# Patient Record
Sex: FEMALE | URBAN - METROPOLITAN AREA
[De-identification: names, ages, dates, MRNs, and addresses within clinical notes are randomized per-mention and may not be internally consistent; named-entity substitution may affect disease eponyms.]

---

## 2017-02-03 ENCOUNTER — CARE COORDINATION (OUTPATIENT)
Dept: ONCOLOGY | Facility: CLINIC | Age: 42
End: 2017-02-03

## 2017-02-03 NOTE — PROGRESS NOTES
Information regarding first visit with A[facility]     --reviewed that Dr. Rodarte schedules 90 minutes for new pt appointments to give plenty of time to patients to answer questions. Encourage patient to bring along list of questions to discuss with Dr. Britton Rodarte.   --explained Dr. Rodarte will determine what testing to order after seeing pt and reviewing past medical history. Testing usually includes blood draw, and urine specime including 24 hour urine test that typically is started the next morning.   --explained that if safe to do so, pt should not take either NSAID's (including salicylates that can be in self care products and foods) or PPI's for 5 to 7 days prior to appt re: some of the testing that Dr. Rodarte may want to perform after seeing pt. Examples of NSAID's are Advil/ibuprofen or Aleve/naproxen and ASA. Examples of PPI's are Prilosec/omeprazole or Protonix/pantoprazole. Stated if questions regarding if any of her medications are either NSAID's or PPI's, pt can discuss with her local pharmacist or call RN. Also stressed only to stop these medications if safe to do so. Also stated these are the only medications Dr. Rodarte would want pt to stop (again if safe to do so).  Continue anti-histamines and other medications that are not NSAID's or PPI's.     expressed concerns that they were hoping to minimize stay in [place] re: young daughter.  Asking if possible to do some testing on Monday evening (arrive in [facility] ~5:00 pm),  24 hour urine testing supplies.  Then would do 24 hour collection Tuesday morning to Wednesday morning so could leave Wednesday.  RN expressed concern about challenge of arriving here in timeframe to be able to collect (and process) random spec.  Stated author would try to check into how late lab is open.    --discussed not uncommon to have to repeat testing and that he will not give dx without supporting lab evidence.     --discussed need to have a local physician working with her  as Dr. Rodarte will not prescribe medications from a distance.      --discussed Dr. Rodarte's willingness to work with local physicians once they initiate contact.  --instruct to bring a list of providers who are involved in pt care and therefore should receive copy of his progress note/recommendations. Also suggested to tell Dr. Rodarte in appt if pt wants to receive a copy of his progress note/recommendations.  --phone number for Gabi Mullins re: costs.  Given clinic nurse line number to call re: additional questions.    Encouraged pt to call if  any questions or concerns.

## 2017-02-09 ENCOUNTER — CARE COORDINATION (OUTPATIENT)
Dept: ONCOLOGY | Facility: CLINIC | Age: 42
End: 2017-02-09

## 2017-02-09 DIAGNOSIS — R53.82 CHRONIC FATIGUE: Primary | ICD-10-CM

## 2017-02-09 NOTE — PROGRESS NOTES
Tried to reach out to  to inform him lab is open for walk in patients until 7:00.  Voice mail box full.  Spoke with Edwin after he left message wondering if missed call was from author and realizing his voice mail box was full (which he reported he emptied).  He also stated in message that he went ahead and booked flight for arrival on Monday and leave on Wednesday late afternoon/evening.  Informed Edwin that lab is open for walk in patients until 7:00 but included not sure that they (lab staff) would allow [place] to leave a random specimen depending on time.  Stated they should be able to  24 hour urine test supplies.  Also discussed that Dr. Rodarte has expressed preference for 24 hour urine testing if he had to choose between either random or 24 hr specimen.  Stated would communicate with Dr. Rodarte regarding lab orders for urine testing.

## 2017-02-10 ENCOUNTER — CARE COORDINATION (OUTPATIENT)
Dept: ONCOLOGY | Facility: CLINIC | Age: 42
End: 2017-02-10

## 2017-02-10 NOTE — PROGRESS NOTES
Left message for Edwin stating that Dr. Rodarte entered all the usual lab orders (blood, random urine and 24 hour urine) but given time they are likely to arrive on the Monday evening (2/27/17) advised that they most they will likely be able to do is the random urine specimen and get supplies for the 24 hour urine testing.  Also stated not sure if sent instructions for 24 hour urine test.  Stated could send to wife's e-mail or another e-mail that they designate.  Requested call back via nurse clinic line with questions or info on where to send instructions.

## 2017-02-27 DIAGNOSIS — R53.82 CHRONIC FATIGUE: ICD-10-CM

## 2017-02-27 LAB
ALBUMIN SERPL-MCNC: 3.9 G/DL (ref 3.4–5)
ALP SERPL-CCNC: 62 U/L (ref 40–150)
ALT SERPL W P-5'-P-CCNC: 46 U/L (ref 0–50)
ANION GAP SERPL CALCULATED.3IONS-SCNC: 7 MMOL/L (ref 3–14)
APTT PPP: 32 SEC (ref 22–37)
AST SERPL W P-5'-P-CCNC: 37 U/L (ref 0–45)
BASOPHILS # BLD AUTO: 0 10E9/L (ref 0–0.2)
BASOPHILS NFR BLD AUTO: 0.4 %
BILIRUB SERPL-MCNC: 0.3 MG/DL (ref 0.2–1.3)
BUN SERPL-MCNC: 7 MG/DL (ref 7–30)
CALCIUM SERPL-MCNC: 8.6 MG/DL (ref 8.5–10.1)
CHLORIDE SERPL-SCNC: 105 MMOL/L (ref 94–109)
CO2 SERPL-SCNC: 30 MMOL/L (ref 20–32)
CREAT SERPL-MCNC: 0.71 MG/DL (ref 0.52–1.04)
DIFFERENTIAL METHOD BLD: ABNORMAL
EOSINOPHIL # BLD AUTO: 0.1 10E9/L (ref 0–0.7)
EOSINOPHIL NFR BLD AUTO: 2.4 %
ERYTHROCYTE [DISTWIDTH] IN BLOOD BY AUTOMATED COUNT: 11.5 % (ref 10–15)
GFR SERPL CREATININE-BSD FRML MDRD: 90 ML/MIN/1.7M2
GLUCOSE SERPL-MCNC: 60 MG/DL (ref 70–99)
HCT VFR BLD AUTO: 31.2 % (ref 35–47)
HGB BLD-MCNC: 10.3 G/DL (ref 11.7–15.7)
IMM GRANULOCYTES # BLD: 0 10E9/L (ref 0–0.4)
IMM GRANULOCYTES NFR BLD: 0.2 %
INR PPP: 0.95 (ref 0.86–1.14)
LYMPHOCYTES # BLD AUTO: 1.4 10E9/L (ref 0.8–5.3)
LYMPHOCYTES NFR BLD AUTO: 31.9 %
MAGNESIUM SERPL-MCNC: 2.5 MG/DL (ref 1.6–2.3)
MCH RBC QN AUTO: 29.9 PG (ref 26.5–33)
MCHC RBC AUTO-ENTMCNC: 33 G/DL (ref 31.5–36.5)
MCV RBC AUTO: 91 FL (ref 78–100)
MISCELLANEOUS TEST: NORMAL
MONOCYTES # BLD AUTO: 0.4 10E9/L (ref 0–1.3)
MONOCYTES NFR BLD AUTO: 9.3 %
NEUTROPHILS # BLD AUTO: 2.5 10E9/L (ref 1.6–8.3)
NEUTROPHILS NFR BLD AUTO: 55.8 %
NRBC # BLD AUTO: 0 10*3/UL
NRBC BLD AUTO-RTO: 0 /100
PLATELET # BLD AUTO: 195 10E9/L (ref 150–450)
POTASSIUM SERPL-SCNC: 4 MMOL/L (ref 3.4–5.3)
PROT SERPL-MCNC: 7.3 G/DL (ref 6.8–8.8)
RBC # BLD AUTO: 3.44 10E12/L (ref 3.8–5.2)
SODIUM SERPL-SCNC: 142 MMOL/L (ref 133–144)
WBC # BLD AUTO: 4.5 10E9/L (ref 4–11)

## 2017-02-28 ENCOUNTER — ONCOLOGY VISIT (OUTPATIENT)
Dept: ONCOLOGY | Facility: CLINIC | Age: 42
End: 2017-02-28
Attending: INTERNAL MEDICINE
Payer: COMMERCIAL

## 2017-02-28 ENCOUNTER — CARE COORDINATION (OUTPATIENT)
Dept: ONCOLOGY | Facility: CLINIC | Age: 42
End: 2017-02-28

## 2017-02-28 VITALS
HEIGHT: 70 IN | TEMPERATURE: 96.8 F | RESPIRATION RATE: 16 BRPM | SYSTOLIC BLOOD PRESSURE: 102 MMHG | WEIGHT: 121.5 LBS | HEART RATE: 75 BPM | OXYGEN SATURATION: 100 % | DIASTOLIC BLOOD PRESSURE: 60 MMHG | BODY MASS INDEX: 17.39 KG/M2

## 2017-02-28 DIAGNOSIS — R53.82 CHRONIC FATIGUE: Primary | ICD-10-CM

## 2017-02-28 PROCEDURE — 99205 OFFICE O/P NEW HI 60 MIN: CPT | Mod: ZP | Performed by: INTERNAL MEDICINE

## 2017-02-28 PROCEDURE — 99355 ZZC PROLONGED SERV,OFFICE,EA ADDN 1/2: CPT | Mod: ZP | Performed by: INTERNAL MEDICINE

## 2017-02-28 PROCEDURE — 99214 OFFICE O/P EST MOD 30 MIN: CPT

## 2017-02-28 PROCEDURE — 99213 OFFICE O/P EST LOW 20 MIN: CPT | Mod: ZF

## 2017-02-28 PROCEDURE — 99354 ZZC PROLONGED SERV,OFFICE,1ST HR: CPT | Mod: ZP | Performed by: INTERNAL MEDICINE

## 2017-02-28 RX ORDER — METRONIDAZOLE 7.5 MG/G
GEL TOPICAL 2 TIMES DAILY
COMMUNITY
Start: 2015-10-29

## 2017-02-28 RX ORDER — KETOTIFEN FUMARATE 100 %
2 POWDER (GRAM) MISCELLANEOUS 3 TIMES DAILY
COMMUNITY

## 2017-02-28 RX ORDER — AMITRIPTYLINE HYDROCHLORIDE 10 MG/1
20 TABLET ORAL DAILY
COMMUNITY

## 2017-02-28 RX ORDER — THYROID, PORCINE 60 MG/1
81 TABLET ORAL DAILY
COMMUNITY
Start: 2015-07-17

## 2017-02-28 RX ORDER — GABAPENTIN 300 MG/1
100 CAPSULE ORAL 3 TIMES DAILY
COMMUNITY

## 2017-02-28 RX ORDER — LORAZEPAM 0.5 MG/1
0.5 TABLET ORAL DAILY PRN
COMMUNITY
Start: 2015-05-13

## 2017-02-28 RX ORDER — DOXEPIN HYDROCHLORIDE 10 MG/ML
12 SOLUTION ORAL DAILY
COMMUNITY

## 2017-02-28 RX ORDER — ALBENDAZOLE 200 MG/1
200 TABLET, FILM COATED ORAL 2 TIMES DAILY WITH MEALS
COMMUNITY

## 2017-02-28 RX ORDER — CETIRIZINE HYDROCHLORIDE 10 MG/1
10 TABLET ORAL 2 TIMES DAILY
COMMUNITY
Start: 2015-06-15

## 2017-02-28 ASSESSMENT — PAIN SCALES - GENERAL: PAINLEVEL: NO PAIN (0)

## 2017-02-28 NOTE — NURSING NOTE
"[place]ia Sheffield is a 42 year old female who presents for:  Chief Complaint   Patient presents with     Oncology Clinic Visit     Mast Cell Disorder        Initial Vitals:  /60 (BP Location: Left arm, Patient Position: Chair, Cuff Size: Adult Regular)  Pulse 75  Temp 96.8  F (36  C) (Oral)  Resp 16  Ht 1.789 m (5' 10.43\")  Wt 55.1 kg (121 lb 8 oz)  LMP 02/17/2017 (Exact Date)  SpO2 100%  BMI 17.22 kg/m2 Estimated body mass index is 17.22 kg/(m^2) as calculated from the following:    Height as of this encounter: 1.789 m (5' 10.43\").    Weight as of this encounter: 55.1 kg (121 lb 8 oz).. Body surface area is 1.65 meters squared. BP completed using cuff size: small regular  No Pain (0) Patient's last menstrual period was 02/17/2017 (exact date). Allergies and medications reviewed.     Medications: Medication refills not needed today.  Pharmacy name entered into EPIC: Data Unavailable    Comments:     15 minutes for nursing intake (face to face time)   Argelia Garcia LPN        "

## 2017-02-28 NOTE — LETTER
"  2017      RE: Virginia Sheffield  [address] [facility] [address]       Patient: Virginia Sheffield   (MRN [id number])   Encounter Date: 2017  Referring: Terrell Boss M.D. ([facility] [place], [place], [phone], fax [phone]), Fe Cole M.D. ([facility], [address], [place] [address], [phone], fax [phone])  Attending: Britton Rodarte M.D.     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  At the beginning of the encounter, I reviewed all available chart data, consisting principally of about 40 pages of outside hardcopy medical records, the salient points of which I've incorporated into the narrative below.  I also reviewed a few e-mails the patient, Dr. Boss, and I had exchanged in .  The history here is a bit complex, and it's probably best to just present it all chronologically, blending HPI and PMH as follows.  Note she presented her history in a bit of a scattershot fashion, and I've done my best to rearrange it chronologically.  Her history of chronic multisystem polymorbidity of generally inflammatory and allergic themes is literally life-long, as she knows she was a \"colicky\" baby from birth and quickly proved \"allergic to breast milk\" and had to be fed with soy-based formula.  She recalls gait problems while learning to walk, and she required braces in toddlerhood.  She knows she has \"always\" (going back at least to her earliest memories) been anemic, though no cause for this has ever been found.  She recalls tonsillectomy and adenoidectomy was performed at 7 due to \"frequent post-nasal drip\" (though I suspect upper respiratory tract infection problems were more significant than just frequent post-nasal drip to get " "to the point of pursuing surgery).  Menarche came at 12 and was notable only for possible menorrhagia for which she then started oral contraceptives a year or two later.  Chronic sinusitis emerged at 15, though she attributes this to insufficient sleep, noting that she has \"always\" (back to her earliest childhood memories) felt sick for not having gotten enough sleep, and she says that once she started getting more sleep, the sinusitis improved.  She recalls developing \"sensitivities\" to various tastes and odors in her late teens, and these problems have slowly but steadily worsened ever since, though they seemed to markedly worsened with her second pregnancy.  Her first anxiety attack came upon graduating from college and continued occasionally after that point, markedly worsening with the second pregnancy (see below).  At 26 she developed sacroiliitis due to a lot of driving in her job as a , and gastritis emerged around this time, too.  Her first pregnancy came at 30 but miscarried around 4-6 weeks, attributed to hypothyroidism discovered at that point.  Her second pregnancy came at 31 and coursed well except for premature labor at 32 weeks, a bout of bronchitis needing antibiotics, and then delivery at 35 weeks.  She says she suffered post-partum depression due to insufficient sleep.  Anxiety, too, re-emerged in this post-partum period (she knows lorazepam helps, but she only uses it occasionally), and insomnia developed in spite of her exhaustion.  At 33 she recognized that capsicum peppers were a principal trigger of her gastritis, so she deleted them from her diet and saw the gastritis remit.  As her daughter was growing up, both of them \"always got whatever was going around.\"  She says she was sick every 1-2 months because of this.  At 36 she was diagnosed with rosacea.  At 37 she began developing excessive gas and episodes of acute diarrhea.  She says testing of a stool sample showed she had \"a " "whipworm parasite,\" which was treated and symptoms resolved.  Soon after this, though, she became hyperthyroid, and it took nine months of frequent adjustments to her thyroid medication to finally stabilize her thyroid function, during which time she was hypersensitive to assorted sensory stimuli.  She also says she has been hypersensitive to assorted sensory stimuli and stressors since her second pregnancy.  Also around age 37 she was started on a number of nutritional supplements but quickly developed palpitations, anxiety, and irritability, especially premenstrually.  At 37 she developed left ankle pain of unclear etiology.  At 38 she developed a frozen right shoulder of unclear cause (possibly due to a \"tiny\" labral tear, she says).  At 38 she learned that ingestion of a high-fat-content meal caused nausea.  Around age 38 she had an infectious episode of some sort and needed antibiotics but found the drug caused the same whole-body burning sensation (including a \"buzzing\" sensation along the length of the spinal column, an \"anxiety in my spine\") that re-emerged and became constant after her third pregnancy.  The same sensation also has happened on eating barbeque at age 39.  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  She says she has been unable to eat protein since age 40.  She also notes that at age 40 she felt the best she had felt in a long time.  She decided to pursue a third pregnancy, but this, too, miscarried at about 4-6 weeks, attributed at the time to her age.  Within days after the miscarriage, she was started on a number of new supplements and went on a [place] vacation.  On the return flight, her legs started itching, and then two days later, while wearing a new (and unwashed) shirt from [place], a \"big red pustular burning rash\" about her bilateral lower anterior ribs erupted.  This resolved quickly with " "hydrocortisone cream, but five days later the same burning sensation returned throughout her body, albeit without any visible rash.  From that point on, eating would reliably intensify symptoms.  Dietary changes were unhelpful.  She began consulting many physicians including at [facility] and eventually the [facility] ([place]).  She says no clear diagnosis was ever made, though at one point she was diagnosed with an eating disorder, and then a subsequent consultation with a [place] psychiatrist refuted that diagnosis.  She says she has developed many more dietary sensitivities since.  She notes her acupuncturist and her  were the first to suspect MCAD might be at the root of her problems, leading to the present evaluation.  She says a low histamine diet has been only slightly helpful.  She says she is down to just 14 foods.  She says she often feels as if her brain is \"on fire.\"  She notes she has lost 20 pounds (to 115 pounds) in the last two years.    On a full review of systems, although she denies any issues with flushing, sweats, tinnitus, epistaxis, easy bleeding, sinonasal congestion, coryza, post-nasal drip, throat irritation, dysphagia, chest pain, gastroesophageal reflux, vomiting, diarrhea, abdominal pain, bloating, edema, adenopathy, syncope, hair problems, or hives, she endorses a wide range of other, chronic/recurrent, episodic/intermittent and/or waxing/waning issues including subjective fevers and presyncope on prolonged standing in childhood, feeling cold much of the time, fatigue (often to the point of exhaustion), malaise, headaches, diffusely migratory aching/pain, diffusely migratory pruritus, unprovoked fluctuations in weight and appetite, irritation of the eyes, acute brief inability to focus vision, easy bruising, a burning sensation in her ears, occasional intranasal sores, burning tongue, proximal dysphagia, a burning sensation in her lungs, post-prandial palpitations, nausea, " "constipation, urinary hesitancy when flying, diffusely migratory weakness, diffusely migratory edema, diffusely migratory tingling/numbness, orthostatic and non-orthostatic presyncope, cognitive dysfunction (particularly memory, concentration, and word-finding), deterioration of dentition despite good attention to dental hygiene, brittle nails, diffusely migratory rashes (typically patchy macular erythema), insomnia, anxiety, depression, modest joint hypermobility, and poor healing in general.  Complete ROS o/w neg.    Family history is notable for a father who is a recovering alcoholic and who has \"always\" had \"digestive issues\" and who had a bout with idiopathic urticaria which resolved, a brother with allergies, a mother with allergies and type 2 diabetes mellitus and obesity and heart disease requiring a pacemaker, \"digestive issues,\" arthritis, \"significant body pain,\" hypersensitivity to odors, intolerance of alcohol, and death recently from complications from coronary artery bypass surgery, and a daughter with food sensitivities and lichen sclerosis. The patient denies any history of smoking, significant alcohol use (in fact, she flushes to red wine and gets an \"easy hangover\" upon drinking even a tiny quantity), or illegal substance use except for marijuana use in high school and college which did help her feel better.    She lists her current medications as azelaic acid topically bid, cetirizine 10 mg bid (\"definitely\" helping), generic oral cromolyn 200 mg tid, compounded oral ketotifen 2 mg tid (\"definitely\" helps, though the effect only lasts 1-2 hours), lorazepam 0.5 mg prn, amitriptyline 20 mg qd, Nature Thyroid 81 mg qd, doxepin solution 12 mg qd, gabapentin 100 mg tid, metronidazole 0.75% topical gel bid, and a course presently of albendazole 200 mg bid to treat leishmaniasis reportedly diagnosed recently in a stool sample.  She lists her current allergies as rash to adhesive tape, hypertension to " "epinephrine, and urticarial rash to penicillins.    Exam finds a tired, abnormally thin woman for her height, concerned and seldom expressing a smile but in no apparent acute distress, pleasant enough given her concern, cooperative, fully alert and oriented, independently ambulatory, presenting with her obviously appropriately concerned/supportive  who was taking notes throughout the encounter and who had an excellent understanding of her history (his memory clearly eclipsed hers at several points in the encounter).  Vitals per chart, notable for /60, pulse 75, weight 122 pounds.  Key findings are her tired, thin, unhappy, vaguely chronically ill general appearance, HEENT benign, no plethora/pallor/diaphoresis/jaundice/rash/bleeding/bruising, neck supple, no JVD or thyromegaly or carotid bruits, no palpable adenopathy or tenderness at any of the usual node-bearing sites, clear lungs, regular heart with no adventitious sounds, abdomen benign, just the very slightest trace of distal bilateral lower extremity pitting edema, neuro grossly intact except for tingling \"all over.\"  On a light scratch test on the upper back, moderately bright dermatographism (erythroderma only, no hives) quickly emerged and was fully sustained (perhaps even brighter?) when last checked 10 minutes later.     Review of available lab data was notable for a wide range of normal labs at [facility] in 6/15 including tryptase, whole blood histamine, CMP, iron studies, folate, B12, and 24-hour urinary N-methylhistamine and 11-beta-prostaglandin-F2-alpha (also negative [id number] mutation analysis in peripheral blood), plus a similar range of normal labs at [facility] in 10/15 (also a negative 24-hour urinary 5-HIAA at this time).  In 7/15 chromogranin A was normal, but transforming growth factor beta 1 was roughly triple the upper limit of normal.  In 9/15 another set of routine chemistries and the above-noted mast cell  labs were again " "normal.  The patient and her  note there is uncertainty as to whether the specimens were handled properly by the various labs except in the most recent round of testing.  Duodenal biopsy obtained at [facility] in 10/15 was read as normal (only routine staining was performed).  Skin biopsy at [email or web address] (unclear date) showed \"urticarial hypersensitivity reaction with slight spongiosis, focally excoriated, with a perivascular and interstitial infiltrate including lymphocytes and eosinophils.\"    Labs done here yesterday included a 100% normal CMP except for mild hypoglycemia (60 mg/dl) and minimal hypermagnesemia, a CBCD notable only for mild normocytic anemia (Hgb 10.3 g/dl), and normal PT/PTT.    A/P: Kudos to the patient's acupuncturist and the patient's , because I think they're likely right in their suspicion that a mast cell activation disorder (MCAD) -- and far more likely the far more prevalent (if only recently recognized) type termed mast cell activation syndrome (MCAS) than the rare (but long recognized) type termed mastocytosis -- is at the root of most or all of the patient's chronic multisystem polymorbidity of generally inflammatory and allergic themes. Beyond all the other diseases already ruled out by the great extent of testing she's undergone to date, I don't know of any other human disease that comes anywhere near as close as MCAS does to accounting, directly or indirectly, for the full range and chronicity of all the symptoms and findings here. (Of note, too, I'm not saying that any of her prior diagnoses are wrong (other than any assertions of psychosomatism which might have been made along the way). It's just that each of them accounts for only one subset or another of all that's gone on in her, while MCAS can account for most or all of everything that's been going on in her.) All of that said, she needs objective laboratory evidence of improperly behaving mast cells (note it's possible the " results from some of her earlier mast cell  determinations might be false negatives due to specimen handling issues), toward which end I ordered the range of testing I typically check in assessing for MCAS, namely, a CBCD, CMP, magnesium, PT/PTT, chilled serum tryptase and chromogranin A, chilled plasma prostaglandin D2 and histamine and heparin, chilled random urinary prostaglandin D2 and N-methylhistamine and leukotriene E4 and 2,3-dinor 11-beta-prostaglandin-F2-alpha, and chilled 24-hour urinary prostaglandin D2 and N-methylhistamine and leukotriene E4 and 2,3-dinor 11-beta-prostaglandin-F2-alpha. I also ordered an anti-IgE IgG and an anti-IgE receptor antibody, which won't be useful diagnostically but may influence certain therapeutic decisions down the road if MCAS is proven. We confirmed she has abstained from confounding use of proton pump inhibitors (PPIs) and non-steroidal anti-inflammatory drugs (NSAIDs) for the prior 5+ days. We provided her careful written and verbal instructions regarding the 24-hour urine collection including the importance of continuous chilling of the specimen throughout collection and transport. She understands the various reasons why a single round of  testing might yield all negative results, and she understands that if this happens, it of course doesn't even begin to invalidate/refute/negate even a single element of her history (which strongly argues for a mast cell disorder). Her history is what it is, so if we get a negative round of testing, I'll simply recommend repeat testing (which she'll of course need to pursue locally), albeit with specimen collection at that point preferably deferred to a particularly symptomatic day. I also asked her to work with our clinic nurse coordinator, Clary Regan RN, to try to arrange for her old duodenal biopsy to be reassessed (either locally or here) with  staining, as mast cells can't be seen with routine H&E  "staining and I've often seen \"normal\" or \"mildly chronically inflamed\" GI tract biopsies in MCAS patients \"light up\" on  staining revealing mast cell disease. If 2-3 rounds of blood and urine testing yields all negative results, and if restaining of any available old biopsies either can't be done for some reason or yields negative results, the \"backup plan\" will be to pursue a fresh set of bidirectional endoscopies to obtain biopsies throughout the luminal GI tract for pathologic evaluation specifically (using  staining at a minimum) for increased (>20/hpf) numbers of mast cells (as often seen mildly-moderately in MCAS, though not to anywhere near the degree (or patterns) seen in mastocytosis).     Although I cautioned her that she doesn't have a definitive diagnosis of MCAS yet, we did engage in extended discussion today about general aspects of MCAS including its essential nature of chronic multisystem polymorbidity of a generally inflammatory theme, the availability of many therapies shown helpful in various MCAD/MCAS patients, the good likelihood of (eventually) finding therapy that will help her achieve the goal of feeling significantly better than the pre-treatment baseline the majority of the time, and the present frustrating absence of any biomarkers that can help predict which of the many available, potentially helpful therapies is most likely to benefit the individual patient. She understands that if we are able to secure a diagnosis of MCAS, she will be dependent on pursuing -- in patient, persistent, and methodical fashion, trying as hard as possible to make just one change at a time -- trials of the various therapies (which, again, lacking predictive biomarkers, we generally pursue in order of escalating cost). She understands that once all test results are available about a month from now, I will write an addendum to this note (to again be distributed to all of her physicians listed below) " "providing my interpretation of the results and recommendations for next steps.     I told her that the only therapy I am willing to empirically recommend in a patient with suspected, but not yet proven, MCAS is a full (H1 + H2) histamine receptor blockade as she is presently doing.  She understands that some patients find that alternative H1 or H2 blockers serve them better, some patients find that tid dosing serves them better than bid dosing, and some patients find that slightly higher dosages (e.g., 20-30 mg rather than 10 mg of cetirizine, or 150 mg rather than 75 mg of ranitidine) serve them better than lower dosages. She will need to \"experiment,\" taking 2-4 weeks with each specific combination of drug/dose/frequency to understand what it can accomplish for her in controlling this disease of naturally waxing/waning intensity. I also cautioned her that MCAD/MCAS patients have quite a predilection to adversely react not necessarily to the active ingredients in their medications but rather to the excipients (fillers, binders, dyes, preservatives, etc.) in their medications. As such, if she experiences an adverse reaction very shortly after beginning an ordinarily well tolerated medication (as is certainly the case with the H1 and H2 blockers), excipient reactivity must be suspected and she should promptly work with her pharmacist to review the medication's ingredient list, try to identify the likely offending ingredient, and try one or more alternative formulations of the medication which don't share any of the offending formulation's excipients. She appears to understand.     She lives far too distantly to return here with any significant frequency.  She understands she will remain 100% dependent on her local providers for management of all acute and chronic issues with the disease.  I'll see her roughly annually for strategic reassessments.     As is usually the case with initial consultations for mast cell " "disease, this was a long encounter, 135 minutes in total, with 70 minutes spent in counseling. The patient and her  indicated that all of their questions at this time had been answered to their satisfaction, and they expressed appreciation for the time I had given them.      Typed, reviewed, and electronically signed by: Britton Rodarte M.D.     DT: 02/28/17 08:33 PM    cc: 1. Terrell Boss M.D. ([address] [place] [address], [phone], fax [phone])  2. Fe Cole M.D. ([facility], [address], [place] [address], [phone], fax [phone])      Addendum (3/25/17): Kudos to the patient's acupunturist and the patient's , as they have been proven right: labs are back are back and are diagnostic. As is commonly seen in this work-up which necessarily casts a fairly wide net over the range of clinically testable mediators which are relatively specific to the mast cell, most of the tests were normal (or virtually so), but the tests detailed below were positive.  (All of the prostaglandin D2 tests were below their lower limits of normal, suggesting specimen mishandling or occult salicylate use, but it's a moot point now that diagnosis has been established.)  I'll also note that somehow we did not get the quantitative immunoglobulin profile I had ordered, so if she's not had that checked locally, it still ought to be pursued locally to rule out a significant (and potentially treatable) immunoglobulin deficiency contributing to the frequent \"infections\" she has suffered (though I'll also note the possibility, now that a diagnosis of MCAS has been established, that many of these episodes could have been sterile (i.e., MCAS-driven), rather than infectious, inflammation).     Therefore, based on (1) a clinical history highly consistent with chronic/recurrent aberrant mast cell  release, (2) a mildly elevated chromogranin A (109 ng/ml, normal 0-95, and " absent any of the known confounders of heart or renal failure, recent PPI use, or evident neuroendocrine cancer) (but a repeatedly normal serum tryptase, largely ruling out systemic mastocytosis), (3) increased (>20/hpf, per the Jesu falcon al., Arch Pathol Lab Med 2006 paper which in my experience is most commonly viewed by pathologists as the arbiter in this matter) mast cells (40-45/hpf) on  staining of the duodenal biopsy obtained in 10/15 at [facility] and originally read (on routine (H&E) staining) as normal, (4) absence of any other evident disease better accounting for the full range and chronicity of all the symptoms and findings in the case, and (5) at least partial response to mast-cell-targeted therapy (e.g., a variety of H1 blockers, cromolyn, ketotifen, a benzodiazepine), mast cell activation syndrome (MCAS; 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 Of note, again, I don't think any of her prior diagnoses are wrong (other than any assertions of psychosomatism that might have been made along the way), but the diagnosis that seems to best underlie/unify the largest portion (potentially even all) of her large array of past and present issues is MCAS, and therefore treatment efforts directed at such would seem to be warranted.  To be sure, all of her physicians must maintain vigilance for other processes for which -- again, whether ultimately dependent on, or completely independent of, her MCAS -- standard therapies other than mast-cell-directed therapy have been defined, as such standard therapies would of course be the more appropriate choices for such processes.  However, with MCAS now having been defined in this patient per published diagnostic criteria, emergence of a new process that is potentially consistent with MCAS can be  "reasonably attributed to MCAS once other \"routine\" evaluation for that process has ruled out other, \"traditional\" causes for such a process.     I'd like to briefly address just a bit more why this is MCAS and not mastocytosis. First of all, I saw no skin lesions suggestive of cutaneous mastocytosis, and her history of symptoms consistent with aberrant mast cell  release dates back to childhood (as typically seen in MCAS, in my experience now across more than 2000 MCAS patients) rather than the de uday presentation in middle or older age usually seen with systemic mastocytosis or the classic presentations of urticaria pigmentosa typically seen in childhood. Her normal tryptase, too, is far more consistent with MCAS and makes systemic mastocytosis (SM) quite unlikely (not impossible, but quite unlikely). The rare normal-tryptase (or minimally-elevated-tryptase) SM virtually always is the indolent form of SM (ISM), and to the best of our present knowledge, there's no difference in the prognosis of, or the therapeutic approach toward, ISM vs. MCAS. Therefore, even if we're missing the uncommon normal-tryptase ISM by not pursuing more biopsies of various extracutaneous tissues, there would seem to be nothing lost by \"misdiagnosing\" her with MCAS. (There certainly are no clinical or laboratory features here to suggest a comorbid hematologic malignancy.) I'll note, too, that transformation of ISM to aggressive SM (ASM) is rare, and transformation of MCAS to any form of SM has in fact never been reported yet in the time since the first case reports of MCAS were published in '07.     As such, we can reasonably confidently exclude mastocytosis as the issue here and I don't think marrow examination is warranted, and until somebody figures out another diagnosis that even *better* accounts for all that has gone on in her, it seems reasonable to go with MCAS as the best root operating diagnosis here.     What I'd like to " do at this point in the discussion is provide a range of general information about MCAS and its therapy.     I will note that it's of course possible that the mast cell activation found in her is purely secondary (to some unknown other chronic inflammatory disease) rather than primary (mutational) -- it will require mutational analysis that won't be available in the clinical laboratory for at least another 5-10 years before such a distinction can be routinely made -- but I will assert, again, that she meets all current diagnostic criteria for MCAS and MCAS is the best unifying explanation at present for her large assortment of problems. As treatments for other (less than unifying) diagnoses have generally not yielded substantial clinical improvement to date, and since a diagnosis of MCAS has now been made per current diagnostic criteria, it would seem that treatment efforts directed at MCAS are warranted (presuming she is less than wholly satisfied with the gains she has made with her present regimen).     I feel it's important to comment on how a normal tryptase is not necessarily inconsistent with mast cell disease. Since its discovery in the 1980s, tryptase has been inexorably linked with mast cell disease and generations of physicians have been trained that it is virtually impossible to have mast cell disease unless serum tryptase is elevated -- but we now know this precept to be incorrect. Although initial studies of tryptase led to thinking its level reflected the total body mast cell activation state, in the last decade there has been a sea change in this understanding and now it is clearly understood (and even publicly acknowledged by tryptase's discoverer) that the level of tryptase far dominantly reflects the total number of mast cells in the body and far less the total body mast cell activation state. As such, one would expect to find the significantly increased tryptase level typically found in >80% of  "systemic mastocytosis patients, while in MCAS (where there is little to no increase in the numbers of mast cells) one would expect to find little to no increase in the tryptase level, and when no increase can be found in tryptase, one typically has to find evidence of mast cell activation by examining other relatively specific mast cell mediators, a tricky business given their typically very short half-lives and great thermolability.     Once diagnosis of MCAS is established, there are initial questions/issues about natural history and prognosis.     Although for many reasons a unifying diagnosis of a mast cell disorder typically isn't suspected until decades after symptom onset, the chronic multisystem polymorbidity of a generally inflammatory   allergic theme that is MCAS tends to initially emerge by adolescence or early adulthood but often happens as early as childhood or even infancy, this last a scenario in which the rare inborn autoinflammatory syndromes (AISs) need to be considered in the differential diagnosis, especially since (1) recent molecular investigations in the AIS area have been discovering that some of them actually are specific variants of MCAS and (2) highly specific (and effective) drugs exist for some of these syndromes. MCAS tends to \"step up\" its baseline symptoms at irregular intervals, generally shortly after a major (physical and/or psychological) stressor (e.g., trauma, major infection, surgery, distant travel with novel antigenic exposure, job loss or other severe financial strain, death of a loved one, etc. etc. etc.). In the absence of formal study yet of this issue, present best estimates of survival in MCAS are for a normal lifespan (akin to what's been shown in studies of indolent SM) and that a wide variety of medications have been shown effective in various MCAS patients. Although there appears to be a very low rate of transformation of indolent SM to more aggressive forms of SM, " "there has not yet been a single reported case (nor I have observed or heard of a single case) of MCAS transforming to any form of mastocytosis. At present, with no objective markers of therapeutic response having been developed yet (and which may be quite difficult to develop given the extreme heterogeneity of the clinical presentation of the disease), the goal of therapy is entirely subjective, namely, feeling significantly better than the pre-treatment baseline the majority of the time. Feeling \"perfect\" or feeling significantly better \"all the time\" is not a reasonable goal given the complexity of the disease (see http://[email or web address]/index.php/page/copelibrary?key=mast%20cells as one illustration of this point). Most MCAS patients are able to eventually identify significantly helpful therapy, but at present there are no published/validated biomarkers that can predict which therapies are most likely to benefit a given patient. As such, both patient and doctor must practice patience, persistence, and a methodical approach -- trying as hard as possible to make just one change in the regimen at a time -- in stepping through trials of various medications (generally proceeding in order of increasing cost given that no better scientifically informed strategy is presently available), retaining treatments which clearly provide significant benefit (which for most medications in this area can be determined within 1-2 months) and decisively stopping those which do not meet this high bar, lest unmanageable polypharmacy develop.     In my opinion, her early history was not burdened with multisystem inflammatory issues nearly as much as one typically sees in classic autoinflammatory syndromes, so I don't think it would be reasonable to pursue genetic testing for such at this time.  Of course, if she proves refractory to a number of MCAS-targeted therapies, the utility of such testing might then be increased.  Initial " evaluation by a genetic counselor usually smooths the way toward insurance coverage of the testing for these conditions (e.g., the periodic fever syndrome 7-gene sequencing panel (code PRFEVERPAN) at [facility] in [place] (http://[email or web address]) or the similar (periodic fever syndrome) 21-gene sequencing panel at [facility] ([email or web address])).     I think her normal plasma histamine and urinary N-methylhistamine levels make it pretty unlikely there's a deficiency in diamine oxidase (KYLIE) or a poor-metabolizing mutation of histidine N-methyltransferase (HNMT) here, and thus I think testing for KYLIE deficiency and mutational analysis of HNMT is not warranted.  Nevertheless, if these tests are desired, KYLIE deficiency testing can be pursued via [facility] in [place], [place] ([email or web address]), and HNMT mutational analysis can be ordered as a single-gene analysis at various facilities (e.g., [facility] in [place], [place] ([email or web address])).     Current understanding of the genetics in MCAS is limited. Repeated preliminary datasets from the [facility] demonstrate the disease is clonal in essentially every patient, albeit vastly heterogeneously so, thereby likely explaining the vast clinical heterogeneity (and vastly heterogeneous therapeutic responsiveness) with which the disease presents. The [facility] team has also provided repeated preliminary datasets demonstrating the disease is likely epidemic (present in at least 5-10% of the general [place] population), unsurprising given increasing data suggesting various epidemic chronic idiopathic inflammatory diseases (e.g., chronic fatigue syndrome, fibromyalgia, irritable bowel syndrome) may well be merely assorted variants of MCAS. Other not-so-obviously-inflammatory (but nevertheless still likely inflammatory) diseases, too, may be assorted variants of MCAS, such as chronic idiopathic urticaria, idiopathic  "anaphylaxis, Lorena Danlos Syndrome Type III, postural orthostatic tachycardia syndrome (POTS), dysautonomia, autism, attention deficit hyperactivity disorder, etc. etc. etc. However, to be clear, none of these diseases has yet been proven to be forms of MCAS, and much research into this hypothesis is needed in each of these diseases. Furthermore, the [facility] team has clearly demonstrated the high familial predisposition of the disease in spite of the fact that the  mutations appear virtually always somatic/acquired (i.e., not germline/inherited), and relatively early in life at that. (It is known that the mutations driving aberrant MC function in any given affected patient in an affected tg are different from the mutations in other affected members of the affected tg, explaining why the different affected members of an affected tg manifest somewhat different clinical presentations. The epigenetic effect of \"anticipation\" is also seen in mast cell disease kindreds, with later generations first manifesting the disease at earlier ages and with overall more severe phenotypes in later generations.) Preliminary data are just beginning to emerge that the reconciliation of these two superficially conflicting observations (familial predisposition vs. the somatic nature of the causative mutations) stems from recognition that most MCAS (and SM) patients also bear (inheritable) epigenetic mutations, and it is currently hypothesized that these epigenetic mutations create states of genetic fragility such that assorted biochemical signal patterns which flood the body in response to assorted (physical or psychological) stressors interact with such fragility states to create the actual genetic mutations in marrow stem cells or pluripotent progenitor cells which then \"trickle down\" to mast cells (and, to be sure, other lineages, but when the end clinical effects of such mutations are dominantly operant in the mast " "cell as opposed to other types of cells, it is reasonable to term the situation a mast cell activation syndrome). These genetic mutations then create states of not only constitutive mast cell activation but also aberrant mast cell reactivity, and the end clinical effects seen in any given MCAS patient are the net results of extremely complex networks of interactions among abnormal (and normal) MCs and abnormal (and normal) other cells.     As previously noted, there is a large array of drugs shown helpful in various MCAS patients, but \"Step 1\" in treating any mast cell disorder -- and I cannot overemphasize the importance of this step -- is identification (as specifically as possible) and avoidance of triggers (be they chemical/antigenic or physical such as cold, heat, ultraviolet light, etc.). Note medication excipients (e.g., fillers, binders, dyes, preservatives) often are triggers, and rapid adverse reaction to an ordinarily well tolerated drug in an MCAS patient is less likely a sign of intolerance of the active ingredient and far more likely a sign of an excipient reaction mandating prompt return to the pharmacist (commercial or compounding) to identify alternative formulations to be tried which do not contain any of the offending formulation's excipients. Adding a drug to the allergy list is unhelpful -- indeed, frankly counterproductive -- when the true offending agent is an excipient in the particular formulations of the drug that were tried. Offending excipients should be identified as specifically as possible, added to the allergy list, screened against the rest of the patient's present medication list, and screened against all medications prescribed in the future.     \"Step 2\" in treating MCAS ordinarily is identifying an optimal full (H1 + H2) histamine receptor blockade as detailed in my initial note, except that I erroneously mentioned in that note that she was already on a full histamine blockade " when actually she's only on a full H1 blockade so far and needs to add an H2 blocker.  Most MCAS patients do much better with histamine receptor blockers (H1 + H2) given at least twice daily rather than once daily; some do even better with tid dosing rather than bid dosing. Some patients clearly do better with one particular H1 blocker compared to another, or one particular H2 blocker compared to another. Thus, experimentation with different H1 and H2 blockers is reasonable to try to identify a particular optimal regimen. With the antihistamines, it usually takes only 2-4 weeks with each particular H1 blocker (at any given dose/frequency) or H2 blocker to identify (across the natural hourly/daily/weekly waxing/waning of symptoms from inappropriate mast cell activation) the benefits and toxicities of that specific regimen, allowing a decision at that point as to which dosing (or medication) change should be tried next. These drugs ordinarily are so well tolerated that if adverse reactions rapidly emerge, excipient reactivity is far more likely than reactivity against the drug molecule itself. In the [place] non-sedating H1 blockers that are commonly tried are loratadine (starting at 10 mg bid), cetirizine (starting at 10 mg bid), fexofenadine (starting at  mg bid), or levocetirizine (starting at 5 mg bid). H2 blockers that are commonly tried are famotidine (20-40 mg bid) and ranitidine ( mg bid); in much of [place] and certain other regions, rupatadine has been demonstrated to be a useful H1 blocker in mast cell disease. Through cautious experimentation, some MCAS patients discover that higher individual dosages (e.g., loratadine 20-30 mg instead of 10 mg) or higher dosing frequencies (e.g., tid instead of bid) bring them significantly greater benefit than lower dosages or frequencies. The patient who is taking too much H1 blocker almost always discovers that dosing is excessive when the typical  anticholinergic toxicities are noted: newly (or significantly worse) dry eyes, dry sinuses, dry mouth, dry throat, urinary hesitancy, and/or constipation.     A few MCAS patients are so severely afflicted as to be in an essentially constant anaphylactoid state. Although I don't think this patient is anywhere close to the point of needing this treatment, I will note -- merely for *potential* future use in this patient, should she advance to the point of suffering severe, chronically life-threatening and/or disabling disease proving refractory to a large number of other treatments -- that I have had success in some (now more than two dozen) very severely afflicted MCAS patients with continuous infusion of (preferably preservative-free) diphenhydramine. I typically start dosing (inpatient, to permit close monitoring) at 5 mg/hr and escalate in increments of 1-2 mg/hr with each flare of symptoms. Dosing above 15 mg/hr is likely to be futile and toxic, but I have now seen (though not yet had opportunity to publish beyond abstract form) excellent responses in the large majority (~90%) of a bit more than two dozen patients in whom I've now tried this, and all the responses have occurred in the 10-14 mg/hr range. Obviously, a semipermanent IV line (typically PICC, PASport, or Portacath) needs to be placed prior to discharge if this treatment is found helpful; note that some patients react to the materials in some lines, so sometimes more than one line needs to be tried. Even once an optimal chronic maintenance dose is identified, patients may continue to have occasional flares, for which bolus dosings of 10-25 mg via the pump are usually sufficient to regain control. Of note, in one patient in whom the infusion of preservative-free IV diphenhydramine seemed to trigger flares and who could not tolerate the infusion at more than 8 mg/hr, a trial of a different 's preservative-free IV diphenhydramine instantly  resolved the intolerability and resulted in good response within the expected dosing window, thus demonstrating there had to have been an unacknowledged excipient or unrecognized contaminant in the first product tried; indeed, I then discovered at the FDA's website that the first product's  (YUNG) had been repeatedly recently cited by the FDA for contaminated product and inactive product in some of its other IV products and also had been cited for failing to respond to prior citations. (I have since gained similar experience with the allegedly preservative-free IV diphenhydramine product from [facility], too. In all of these few, severely afflicted patients who failed YUNG and [place] IV diphenhydramine, a trial of similar product from [facility] then proved successful.) In other words, although it is as theoretically possible to develop true allergy to diphenhydramine as to any other medication, ordinarily diphenhydramine is an extremely well tolerated drug, and thus even in a formulation which supposedly contains absolutely nothing but diphenhydramine and water, intolerance should raise more concern for excipient reactivity than diphenhydramine reactivity.     I will further note that I also have (unpublished, limited) clinical experience that addition of continuous famotidine infusion (2.5 mg/hr) to continuous diphenhydramine infusion (CDI) can provide clinically significant further disease control beyond what is seen in some patients with CDI together with intermittent (oral or IV) histamine H2 receptor blocker therapy.  Still, if it is ever determined that CDI is necessary in this patient, I would recommend starting it first and establishing a stable, beneficial dose before adding continuous famotidine (or ranitidine), so that the different benefits from the different infusions can be distinguished.     Once an optimal full histamine receptor blockade has been established (presuming such drugs help at  all; note that the heterogeneity of the disease is such that one can't presume *any* mast-cell-targeted medication or medication class to necessarily prove effective in all MCAS patients), the question becomes what to try next. Given the lack of validated biomarkers at present to predict optimal therapy for the individual MCAS patient (see the note in the following paragraph for a bit more discussion on this important point), I tend to start inexpensively and progress by cost as needed (though with occasional, carefully considered exceptions as noted below). Whenever possible, one intervention should be tried at a time, generally starting at a low dose and escalating step-wise in dose or frequency about every 1-2 weeks as tolerated so that a maximally tolerated dose and a maximally effective dose and frequency can be clearly identified. If the intervention is tolerated but significant benefit is not clearly identified after 4-8 weeks, the drug should be dropped and the patient should proceed to the next trial. When the medication that is significantly effective for a patient's particular variant of mast cell disease is found, the improvement is often so starkly apparent to the physician as he walks into the exam room at the next check-up in 1-2 months that sometimes words do not even need to be exchanged.     There is an understandable temptation to expect that elevated prostaglandins should nadiya for likelihood to respond to NSAIDs, or that elevated leukotrienes should nadiya for likelihood to respond to leukotriene receptor antagonists, or that elevated histamine or histamine metabolites should nadiya for likelihood to respond to histamine receptor antagonists and/or diamine oxidase, etc. etc. etc., but the reality is that at present there simply are no data demonstrating such relationships. The mast cell is capable of producing and releasing more than 200 mediators, and the few we measure obviously are a very poor  "surrogate for the totality of the signalling chaos in the disease, plus, as noted above, there are preliminary data suggesting extreme mutational heterogeneity in multiple mast cell regulatory elements in essentially every patient with the disease, all but guaranteeing extreme heterogeneity in patterns of clinical presentation and patterns of therapeutic responsiveness.  Thus, while there's certainly nothing wrong in trying, for example, a leukotriene blocker as an early intervention in an MCAS patient with elevated leukotrienes, one should not draw any inferences at all (about, for example, the accuracy of the diagnosis) if the patient fails to respond to such \"logical\" therapy. The disease is simply far too complex for expectations of response to be that simple. Similarly, I should emphasize that the anecdotal observations I offer below (e.g., patients with diffusely migratory pain, especially bone pain in the legs, tend to respond to hydroxyurea, and patients with inappropriately \"normal,\" or even mildly elevated, H/H tend to respond to imatinib) are just that -- anecdotal -- and have not yet been published or otherwise subject to peer review, let alone put through formal evaluation to establish them as \"validated biomarkers.\" All I can offer at this point is my accumulated clinical experience in treating more than 1,000 MCAD patients (the vast majority with MCAS). This certainly will be important research to be done as funding for such can be obtained, but the bottom line at present is that research has not been done, so the physician treating an MCAS patient needs to keep in mind the great limitations on what's currently *reliably* known about MCAS.     On a matter that's different from, but nevertheless somewhat related to, the excipient issue, I should note that if the patient has any known drug-metabolizing-enzyme (DME) polymorphisms (e.g., in cytochrome p450 isozymes 2C9, 2C19, 2D6, or 3A4, all of which can " "be genotypically tested quite readily in the [place] dosing adjustments will be needed as appropriate for the patient's particular polymorphisms. Like MCAS itself, pharmacogenomic polymorphisms are far more prevalent (by available epidemiologic data) than physicians typically suspect. A poor-metabolizing DME polymorphism should be suspected (and the patient should be appropriately genotyped) if, after the patient has had some time on the drug at routine doses, a toxicity develops which is typically seen only at high doses of that drug; conversely, a rapid-metabolizing DME polymorphism should be suspected (and, again, the patient should be appropriately genotyped) if an ordinarily very reliable drug effect (e.g., INR increase with warfarin) is not seen with typical dosing. It is unknown whether there is a relationship between the genetic/epigenetic origins of mast cell disease and the genetic basis of DME polymorphisms, but I certainly have seen many DME polymorphisms in MCAS patients.     Significantly beneficial drugs obviously should be retained in the regimen beyond the trial period. There are no biomarkers at present to identify when the disease has come \"adequately\" under control. It is purely the patient's subjective judgment as to whether sufficient improvement has been attained to preclude, at least for the time being, further medication trials, or not. The goal in this very complex disease is to identify a regimen that helps the patient feel significantly better than the pre-treatment baseline the majority of the time, and with sufficient patience, persistence, and a methodical approach (trying as hard as possible to make just one change in the regimen at a time) exercised by both patient and local treating physician, in my experience most MCAS patients have been able to attain the goal.     Other inexpensive agents to be considered include potentially sedating H1 blockers (e.g., hydroxyzine, doxepin, " cyproheptadine, clemastine), NSAIDs (note caveats below), quercetin or other flavonoids, alpha lipoic acid, N-acetylcysteine, vitamin C, vitamin D, benzodiazepines, and even amphetamines and low-dose naltrexone; KYLIE supplements, too, occasionally provide some help.  More expensive agents include leukotriene inhibitors, ketotifen (this is more expensive only in the [place] it usually is very inexpensive in every other country on the planet), cromolyn, pentosan, ivabradine, dronabinol, and hydroxyurea. Like ketotifen, there is another mast-cell-stabilizing/anti-inflammatory agent, too -- tranilast -- readily available in the [place] but only available in the [place] via compounding. Substantially more expensive agents include omalizumab and tyrosine kinase inhibitors such as imatinib. I also have seen somatostatin occasionally prove helpful for refractory non-infectious diarrhea in MCAS.  Although there is not yet any reported use of alpha interferon in MCAS, the systemic mastocytosis literature shows that this drug can help reduce, in some patients, not only tumor load but also mast cell activation symptoms, therefore arguing for potential utility of the drug in MCAS (more comments below). Immunosuppressants such as hydroxychloroquine, azathioprine, cyclosporine, rapamycin, sirolimus, tacrolimus, mycophenolate, cyclophosphamide, etc. tend to help little but occasionally show benefit and thus are not entirely unreasonable to try; dosing is individualized, but it is reasonable to start as is typically done in other situations in which these drugs are used. There are theoretical reasons why newer targeted anti-inflammatories (e.g., infliximab, etanercept, adalimumab, etc.) and Janus kinase (CHRISTINE) inhibitors might be helpful in at least some cases of MCAS, but there have not yet been any reports of ad hoc use of such drugs in MCAS, let alone formal studies. Of note, too, are the (expensive) NK-1 receptor blockers (e.g.,  "aprepitant), which are approved for refractory post-operative or chemotherapy-induced nausea and vomiting but have not yet been case-reported or formally investigated (let alone FDA-approved) in \"mast cell disease,\" yet they have been shown helpful in \"refractory pruritus\" of both malignant and benign/idiopathic etiologies, and one could be forgiven, in my opinion, for suspecting \"refractory pruritus\" is likely a manifestation of mast cell activation; it's interesting that binding of substance P ligand to the NK-1 receptor (which is known to be expressed on the mast cell surface) is known to cause explosive mast cell degranulation.  Again, there simply is no way to predict which medications are most likely to help which MCAS patients, and it is the treating physician's best judgment as to the specific sequence of medication trials that will best suit the individual patient. There are no standards established in this matter, and there is no \"right\" or \"wrong\" to trying one medication sooner rather than later.     I typically start a trial of doxepin in an MCAS patient at 6.25-10 mg bid, escalating weekly as tolerated (sedation is usually the limiting toxicity) in 6.25-10 mg bid steps to maximal efficacy or a maximum dose of about 40-50 mg bid. Hydroxyzine can be tried starting at 10-25 mg bid and escalating every 1-2 weeks in steps to  mg bid-tid. Cyproheptadine can be tried starting at 4 mg bid-tid and escalating every 1-2 weeks in steps; maximum dosing typically is 8 mg qid.  Clemastine (which is usually accessible over-the-counter) can be tried starting at 1.34 mg bid, escalating weekly as tolerated in steps of 1.34 mg bid to maximal efficacy or a maximum dose of 2.68 mg bid-tid.     Stimulants such as Adderall (amphetamine-dextroamphetamine, or analogs such as lisdexamfetamine) or Ritalin (methylphenidate) are occasionally helpful. Adderall can be started at 5 mg once or twice daily and escalated in steps " to as high as a total daily dose of 60 mg. It probably should not be used in patients with a history of substance abuse. Ephedrine starting at 12.5 mg bid may be helpful; it can be stepped up every week or so as tolerated to as high as 150 mg daily in divided doses. Methylphenidate can be tried at 5 mg bid and escalated in steps every 1-2 weeks as tolerated to maximal efficacy or a maximum dose of 20 mg bid-tid (optimally 30-60 minutes before meals); if the drug proves helpful, long-acting (and thus potentially more convenient) formulations are available.     Narcotics often are triggers in MCAS. I try to avoid prescribing narcotics for MCAS patients as much as possible. In my experience and as reported in some literature, tramadol, fentanyl, and hydromorphone tend to be better tolerated than other narcotics. I have very limited anecdotal experience, too, that buprenorphine (e.g., the Butrans patch) can be well tolerated and helpful.     NSAIDs can be helpful in some mast cell disease patients but serve as triggers in others. If there is any history of NSAID intolerance, then consideration must be given to having an allergist take the patient through an NSAID desensitization protocol prior to starting a trial of NSAIDs. The NSAID most commonly used in NSAID-tolerant and -responsive MCAS patients is simple aspirin, typically beginning cautiously at 40-80 mg bid and doubling, as tolerated, approximately every 4-14 days to maximal efficacy. I would not push this past 500-650 mg bid, as I have reliably seen intolerable toxicities at total daily doses in excess of 1300 mg/d. Maintenance of 325-650 mg bid dosing requires standing GI prophylaxis in the form of either H2 blocking and/or PPI therapy. I should note, too, that I have seen MCAS patients in whom all standard COX1/COX2-blocking NSAIDs are intolerable but who then tolerate COX2-selective celecoxib (typically 100-300 mg bid) quite well and to good effect. That  said, celecoxib has a sulfa moiety and traditional teaching had long been that this drug should be avoided in sulfa-allergic patients, though more recent published experience (e.g., http://www.nejm.org/doi/full/10.1056/EVIAxv711032) suggests this is an insignificant consideration and it's OK to try celecoxib in sulfa-allergic patients.     Quercetin, a flavonoid, can be obtained over-the-counter and is typically started at 250-500 mg bid and escalated in dose or frequency every 1-2 weeks as tolerated in steps of 250-500 mg bid to maximal efficacy or a maximum dose of about 1000 mg tid. If there are hints of a response, a more water-soluble (and thus better absorbed and sometimes more effective) form of this drug, quercetin chalcone, can be tried (typically at about half the dose of quercetin).     Alpha lipoic acid is typically started at 100-300 mg bid and escalated every 1-2 weeks as tolerated to maximal efficacy or a maximum dose of about 300-600 mg bid. In my experience this has tended to benefit sensory neuropathy more than other symptoms, but I also have seen an occasional case in which it provided alvarez global improvement.     N-acetylcysteine is typically started at 300-600 mg bid and escalated every 1-2 weeks as tolerated to maximal efficacy or a maximum dose of about 600-1200 mg bid. In my experience this benefits few patients (most commonly those with presyncopal issues), but sometimes its benefits are global and alvarez.     Vitamin C has been repeatedly shown to inhibit mast cell production and release of histamine. It's typically dosed at 750-1000 mg in a once-daily slow/extended-release form, though I've had one patient respond well at just 500 mg once daily, and some patients report use of such a product bid helps more and appears sustainably tolerable and effective.     Although vitamin D has not yet been reported to have helped any form of human mast cell disease, I will note that a few of my  "MCAD/MCAS patients who have been prescribed vitamin D supplements by their other physicians to address osteopenia/osteoporosis have reported improvement (soon after beginning the supplement) in symptoms which almost certainly are driven by their MCAS, and a potential direct mechanism for such a response is clear since (normal and malignant) mast cells are known to bear cell-surface vitamin D receptors and since engagement of that receptor by vitamin D (calcitriol) clearly results in activation of various intracellular pathways that result in decreased inflammation as shown by a number of measures. As such, and again with the understanding that there are no formal studies of such, vitamin D supplementation in moderation (~1,000 IU per day) would seem to be a not unreasonable, and almost certainly non-toxic (excipient issues notwithstanding) and inexpensive, intervention to try in MCAS, especially in those in whom vitamin D deficiency is identified. Of note, patients who are severely deficient in vitamin D at baseline probably initially need more aggressive supplementation (e.g., ~50,000 IU weekly for 6-8 weeks) before pursuing the above-noted daily supplementation.     I have heard of mast cell disease patients who have improved with low-dose naltrexone but have seen significant improvement in only one patient thus far. Dosing typically is in the range of 1.5-6 mg/d, though some patients may find split dosing bid more helpful.     KYLIE supplements are occasionally helpful and are typically dosed in terms of histamine-degrading units (HDUs), e.g., the HistDAO product is dosed as 20,000-40,000 HDUs (1-2 capsules), preferably 15 minutes before meals, especially meals with known higher histamine content.     Speaking of meals, it should be noted that some patients find \"low-histamine diets\" (the specific nature of which seems to vary substantially from one author to the next) helpful, other patients find other diets " "helpful, and most patients find diets of any sort generally unhelpful.  In general, dietary interventions should be viewed as exercises attending to \"Step 1\" above, i.e., identify triggers as precisely as possible, and then avoid them.  In other words, if a high-histamine-content food such as cheese or wine is found to reliably trigger a flare of symptoms, then that food should be avoided.  All one can do with regard to diet is avoid extreme diets and consider each dietary intervention as, well, an intervention akin to a medication intervention: if it has not clearly provided significant benefit after about a month, it should be abandoned and the patient should move on to another intervention/trial.     Also with regard to dietary challenges, more severely afflicted MCAD/MCAS patients sometimes have so much difficulty tolerating a very wide range of foods that \"safe\" oral intake is reduced to an extremely limited dietary range, and sometimes it unfortunately becomes the case that simply no oral dietary intake at all is tolerated. In such patients, it is important to remember that not every dysfunctional mast cell in the body of an MCAS patient is dysfunctional in the same way as every other dysfunctional mast cell in that patient's body, and it often is the case that mast cells in different sites in the patient's body -- even in different segments of a given organ/system -- will be dysfunctional in different ways or degrees. As such, even though the mast cells in the proximal GI tract (mouth, throat, esophagus, perhaps even stomach) may be so widely reactive as to preclude tolerable ingestion of any dietary material, it remains possible that the mast cells in the small and large bowel may still tolerate dietary material and permit absorption of nutrients. In other words, in some MCAS patients who simply can no longer eat, a PEG, PEJ, or PEG-J tube may permit continued enteral feeding, a far better approach to " "nutrition than parenteral nutrition (which indeed I have seen become necessary in a very few MCAS patients). Although I have seen occasional patients unfortunately react to such tubes themselves (requiring replacement with tubes constructed of alternative materials/plastics), in my experience most MCAS patients who come to require tube feeding adequately tolerate the tube (and the placement procedure), and then the question becomes which formula to use. I have seen a wide range of formulas prove successful -- and unsuccessful -- in MCAS patients, and only a patient, trial-and-error process will prove successful in the end (though, again, a very occasional patient proves intolerant of all available/accessible formulas and thus comes to need parenteral nutrition, with the expected much higher complication rate). In my experience, Neocate Jr. and Elecare Jr. have been the most consistently (but, again, not universally) tolerable formulas.     Benzodiazepines -- typically at low doses but given regularly because of the short half-lives of most of the drugs in this class -- also can be helpful, likely because they address the inhibitory mast-cell-surface benzodiazepine receptors. (Of course, they also address similar neuronal receptors, and given the physical proximity of mast cells and neurons in many tissues and the extensive \"cross-talk\" between these two types of cells, it should not be surprising that \"calming the nerves\" can bring about a useful downregulating effect on mast cells independent of whatever direct downregulating effect such a drug might have on mast cells via the mast-cell-surface benzodiazepine receptor.) Lorazepam is a reasonable choice, and even though clonazepam clearly binds to the mast-cell-surface benzodiazepine receptor less well than lorazepam, some patients respond well to clonazepam, too, but these drugs have short half-lives, so any benefit she gains from them will wear off fairly shortly, " thus justifying regular dosing if it is going to provide her as much help as possible. (It is for this pharmacokinetic reason that some MCAS patients who benefit from lorazepam or clonazepam clearly do better at tid dosing than bid dosing.) I typically start lorazepam or clonazepam dosing at 0.25 mg bid, escalating every 1-2 weeks as tolerated in 0.25 mg bid increments to maximal efficacy or a maximum dose of 1-1.5 mg bid-tid. An occasional patient will even do remarkably well at just 0.125 mg bid, so there's nothing wrong with starting at even that cautious a dose. Sometimes diazepam, because of its longer half-life, comes to be identified as the preferred agent of this class in the individual patient. Midazolam is usually an excellent choice for perioperative management.     Steroids of course are inexpensive and often helpful in settling acute flares of mast cell disease, but their long-term toxicities make them unattractive as chronic treatment. Of note, though it's virtually impossible for a steroid to be allergenic (just as with H1 and H2 blockers as discussed above), non-IV steroid injections (as given in painful joints, for example) often contain excipients and/or -dana anesthetics which indeed can be provocative to MCAS patients. The full ingredient list of any steroid injection should be carefully reviewed in advance -- and then re-reviewed if a reaction develops.     The mast cell's  repertoire includes a number of leukotriene moieties, and just as one can't predict which benefits might be seen with other medications tried in this disease, one shouldn't assume that there can't be any benefits beyond the respiratory tract from this traditionally asthma-targeted drug. The leukotriene receptor blocker that's usually tried first is montelukast. In my experience, MCAS patients who respond to montelukast usually respond better to twice-daily dosing (10 mg bid) than once-daily dosing; occasional  patients respond even better at 20 mg bid.  I have not seen more benefit with dosing of the leukotriene receptor blockers more frequently than bid. (Some patients clearly do better with brand-name Singulair vs. generic montelukast, or vice versa, likely due more to excipient issues than anything else.) Leukotriene synthesis inhibition can be tried, too, with zileuton, which has a short half-life such that 600 mg qid dosing generally is recommended over 1200 mg bid dosing. However, an extended-release formulation of zileuton is now available; dosing is 1200 mg bid. Liver function tests should be checked at baseline and then monitored monthly in the first quarter and then quarterly when starting zileuton.     Ketotifen is available very cheaply in every country on the planet except the [place] where it is available in oral form only via compounding, which of course incurs substantial cost. It is commonly started at 1 mg bid, escalating every 1-2 weeks as tolerated in steps of 1 mg bid to maximal efficacy or a maximum dose of 4-6 mg bid-tid. Ketotifen eyedrops can be helpful for the eye irritation frequently seen in MCAS, and this is the sole form of ketotifen that is available in the [place] through standard commercial pharmacies. Demonstration of efficacy of ketotifen in one form is often a sign that the other form will be effective, too. In my experience, different compounding pharmacies have quoted vastly different prices for compounding oral ketotifen; patients are advised to obtain multiple quotes. Although I do have some patients who have chosen to import inexpensive commercial oral ketotifen formulations from foreign pharmacies, given the many concerning reports of poor quality of some medication products obtained through some foreign pharmacies, I do not recommend my [place] patients obtain through a foreign pharmacy any pharmaceutical which can be legally (even if more expensively) obtained through a [place] pharmacy.  Especially given the legal protections afforded [place] patients who obtain medications through [place] pharmacies -- protections which may be partly compromised or even non-existent in dealing with foreign pharmacies -- foreign-sourcing of pharmaceuticals is a risk calculation to be made entirely by the patient.     As mentioned above, tranilast is readily available in the [place] but only available in the [place] via compounding. Dosing typically is 200 mg tid.     Especially in view of how the same serotonin reuptake elements present on neurons are also present on the surface of the mast cells, selective serotonin reuptake inhibitors (SSRIs) can be helpful in some MCAS patients both through neuronal binding and mast cell binding. Dosing of SSRIs in MCAS is similar to dosing of these drugs for their approved indications. Of note, physicians who prescribe SSRIs in MCAS patients must be alert to development of, and know how to manage, serotonin syndrome, whose presentation can easily be confused for a flare of mast cell activation.     Cromolyn is not absorbed to any significant extent (there is controversy as to whether this holds true in the pulmonary tree) and thus does not circulate systemically to any significant extent but still can be helpful in its OTC nasal spray (Nasalcrom) and eyedrop (Opticrom) formulations as well as when inhaled or swallowed. (A cream for topical cutaneous use can be compounded at home, too, using a bottle of Nasalcrom and various skin creams such as Eucerin; recipes can readily be found on-line.) The oral form is typically started as 100 mg (one ampule) twice daily (preferably, but not necessarily, 30 minutes before meals) and escalated every 1-2 weeks in dose or frequency as tolerated to maximal efficacy or a maximum dose of 200 mg qid, though a few patients have told me that 300-400 mg qid has provided them optimal benefit from the drug and that it's unhelpful for them at lower doses. The  "nebulized form is typically started at 20 mg bid and escalated every 1-2 weeks as tolerated to maximal efficacy or a maximum dose of 20 mg qid. In a minority of patients, initiation of cromolyn causes an initial mild-moderate flaring of disease that usually can be managed with simple extra dosings of \"rescue medications\" (e.g., H1/H2 blockers, possibly also benzodiazepines and/or steroids). Such flares typically settle after 3-7 days, but if flares are severe or appear to be coursing chronically rather than settling, then the drug should be stopped unless it is the generic formulation that was initially dispensed, in which case a trial of brand-name Gastrocrom should also be pursued, as I have seen a number of MCAS patients who find the generic formulation intolerable but who then tolerate, and respond well, to Gastrocrom (in spite of both products bearing identical ingredient lists of just cromolyn and sterile water), obviously implicating the presence in the generic product of an excipient of some sort (whether known to the  or not) which is an offending/triggering excipient in at least some MCAS patients. Importantly, cromolyn is not absorbed, so local and distant benefits seen from successful inhibition of mast cell activation at one site by one form of cromolyn (e.g., oral) may be different from local and distant benefits seen from additional application of cromolyn at a different site (e.g., the sinonasal tract, with nasal cromolyn spray).     Pentosan is typically used to help settle mast cell activation in the  tract (e.g., when the sterile inflammatory symptoms of \"interstitial cystitis\" -- frequency, urgency, dysuria, etc. -- are present). Dosing is 100 mg bid-tid and liver function tests must be monitored (typically monthly in the first quarter and quarterly thereafter).     Long available in [place] until finally gaining approval in the [place] in 6/15, ivabradine is officially approved for " "heart failure but can be helpful in MCAS, principally for patients particularly bothered by tachycardia. The approved initial dosing of 5 mg bid often is excessive for MCAS patients, and starting instead at just 1.25 mg once daily is probably more appropriate, escalating every 1-2 weeks as tolerated (bradycardia often is the limiting symptom) to maximal efficacy or a maximum dose of 7.5 mg bid.     Much as how benzodiazepines can downregulate neurons and mast cells via inhibitory cell-surface benzodiazepine receptors, dronabinol can downregulate neurons and mast cells via inhibitory cell-surface cannabinoid receptors. Dronabinol dosing typically begins at 2.5 mg bid and escalates every 1-2 weeks as tolerated in steps of 2.5 mg bid to maximal efficacy or a maximum dose of around 5-7.5 mg bid-tid. The key compound desired in the cannabinoids, of course, is cannabidiol, not the \"high\"-producing THC. I have heard from occasional MCAS patients who report cannabidiol oil (now legally accessible in certain localities) to be helpful. It is difficult to recommend smoking of cannabis due to the many other toxic compounds in smoke, which in general is a mast cell activator.     Hydroxyurea has been recognized for decades as capable of settling mast cell activation in some patients. The dosing needed to achieve such effect is typically low, starting at 500 mg once daily and escalating after 2-4 weeks as tolerated to maximal efficacy or 1000 mg once daily (though some patients report better effect at 500 mg bid). Patients who react acutely and severely to the \"Hydrea\" 500 mg formulation likely are reacting to excipients, and I have found most such patients (once recovered after having stopped the medicine) subsequently tolerate and respond well to an alternative 200 mg \"Droxia\" formulation, with optimal dosing usually working out to be in the 600-1000 mg total daily dose range. In my experience, hydroxyurea has been particularly " "useful for treating the diffusely migratory soft-tissue/bone aching/pain commonly seen in MCAS.     Perhaps due directly to the IgG receptors on the surface of the mast cell, and/or perhaps due to other, less direct mechanisms, IV immune globulin (IVIG) helps some MCAS patients (who often first come to IVIG therapy via diagnosis (prior to diagnosis of MCAS, of course) with \"combined variable immunodeficiency\" or other, typically poorly defined immunodeficiency syndromes), principally (per my unpublished observations) in reducing fatigue for 1-2 weeks, in accordance with the half-life of human antibodies. Due to the drug's modest and brief benefits and great expense, I am not much of a fan of using IVIG for treating MCAS, and I suspect most patients being treated as such likely can find more medically and financially effective therapy. Still, when most or all other options have been exhausted, it is not an entirely unreasonable option from a biological plausability perspective, though I should note I'm unaware of any published literature (even case reports) actually supporting this particular use of IVIG.     As noted in many case reports now in the peer-reviewed literature, the anti-IgE monoclonal antibody omalizumab appears to benefit some patients with MCAS, utterly independent of the pre-treatment IgE level. Dosing typically begins at 150 mg subcutaneously once every four weeks and escalates in steps as high as 300 mg subcutaneously every two weeks. In counseling patients about the risks of the drug, they should be advised of the 0.1% risk of fatal anaphylaxis mentioned in the product insert, and it is important to follow the 's recommendations to observe the patient in the infusion suite for 2-3 hours after each of the first three injections and then at least 30 minutes after each subsequent injection. In my experience, omalizumab tends to be more helpful for MCAS patients with prolific allergies, " "but its great cost (list price approximately US$30,000/year) needs to be considered when deciding whether to try it ahead of less expensive therapies. Before starting omalizumab, it may be worthwhile checking an anti-IgE IgG level, as an elevated level may indicate the presence of a true autoimmune-mediated mast cell activation for which rituximab (see below) might also be worth trying. Again, if this is going to be checked, it needs to be checked before starting omalizumab since the drug will confound the test and the patient will have to be off the drug at least a year before a reliable native anti-IgE IgG level can be determined again.     Similar cost-benefit calculation is required regarding the tyrosine kinase inhibitors. The prototypical tyrosine kinase inhibitor imatinib (~[place]$100,000/year in the [place] for name-brand \"Gleevec\" from [employer], or ~US$60,000/year for the newly available generic imatinib from [employer]) is believed to target and block/inhibit some of the constitutively activating mutations in KIT suggested by some of the published research to be present in virtually every MCAS patient. The [id number] mutation found so frequently in mastocytosis is resistant to imatinib, but this mutation is virtually never found in MCAS. Imatinib-sensitive mast cell disease tends to be sensitive to low doses of the drug. Imatinib is typically managed by a hematologist/oncologist. A starting dose of 100 mg once daily is appropriate, escalating after one week (if tolerating it OK but unimproved) to 200 mg which typically is given once daily, though I have seen occasional patients report substantially better effect at 100 mg bid or 50 mg tid-qid dosing. In my experience now in seeing this drug control MCAS to a significant degree in several dozen patients, the \"sweet spot\" for dosing tends to be a 200 mg total daily dose (most do fine with once daily dosing), but I do have a few patients who have clearly " "identified that higher doses (300-600 mg total daily dose) definitely serve them better. In my experience, imatinib has tended to provide substantive benefit in MCAS patients manifesting relative or absolute erythrocytosis, perhaps as a consequence of constitutive KIT activation in turn driving JAK2 activation. (Of course, activated KIT will drive activation of the other, inflammation-driving JAKs, too, suggesting a potential role for the typically promiscuous CHRISTINE inhibitors in controlling at least some of the symptoms in mast cell disease. Indeed, I have already seen (but not yet published) excellent responses in mast-cell-driven symptoms from ruxolitinib in myelofibrosis and polycythemia vera patients and from tofacitinib in rheumatoid arthritis patients.) Absolute erythrocytosis, of course, is easy to recognize (hemoglobin, hematocrit, and/or red cell count above the upper limit of normal), but a relative erythrocytosis is more difficult to recognize, manifesting as a \"normal\" H/H in patients with marked multisystem inflammation which one would expect to cause a secondary anemia of chronic inflammation. A chronically inflamed MCAS patient's \"normal\" H/H may be evidence of the abnormal \"pressure\" being exerted on marrow to overproduce red cells, and this finding, set against a history and exam strongly suggestive of significant chronic multisystem inflammation, hints that a trial of imatinib might be appropriate sooner than its great cost might otherwise warrant. In patients with trying economic circumstances, sometimes the 's patient assistance program needs to be engaged in order for the patient to be able to access imatinib. Additional manufacturers are expected to make generic imatinib formulations available beginning in August 2016 when [employer] loses its exclusive license for marketing generic imatinib, and the price is expected to fall dramatically further at that point. Patients " experiencing difficulty tolerating [employer]- and [employer]-branded imatinib may find future formulations of imatinib, using different excipients, more tolerable. It remains to be seen whether imatinib will be made available by any  for compounding.     As inferred above, this patient's chronic multisystem inflammation would be expected to cause at least a mild anemia of chronic inflammation, and that's what's being seen in her, so this would argue (again, based solely on my experience and not any study) against a trial of imatinib any earlier in the sequence of therapeutic efforts than its extraordinary cost ordinarily would warrant. (One wonders whether the polycythemia sometimes seen in MCAS comes dominantly from constitutive activation of JAK2 caused by (mutated), constitutively activated KIT (various mutant forms of which imatinib and the other tyrosine kinase inhibitors can address).)     I also have seen low-dose imatinib (again, most commonly 200 mg/d) quickly bring complete resolution to the lipodystrophy (along with great improvement in many other symptoms) in a number of patients who were diagnosed with Dercum's disease before later being discovered to have MCAS as their unifying diagnosis. I also have seen low-dose imatinib quickly bring complete resolution to very severe, otherwise refractory hypertriglyceridemia, similarly suggesting there is some pathway by which MCAS can generate such a dyslipidemia.     Some patients who do not respond to imatinib go on to respond well to other TKIs such as dasatinib or nilotinib. As with imatinib, usually only low doses (e.g., 20-40 mg of dasatinib daily) are needed in such patients. Although there are a very few cases reported in the literature of nilotinib helping to control mast cell disease, I myself have seen no successes with this drug in the few patients in whom I have tried it; in fact, I have seen acute intolerance in about half the patients in whom I  have tried it, again suggesting an issue with reactivity to the medication product's excipients rather than nilotinib itself. I also am aware of a few cases (one recently published in the  Journal of Haematology) in which low-dose (12.5 mg/d) sunitinib was given for very severe MCAS and rapidly achieved complete response (1 case) or very good partial response (a few additional cases) without any apparent toxicity (now sustained about two years in the published patient with a complete response). (There also is a published pre-clinical report in a rat model providing rationale for trying sunitinib in mast cell disease.)     In general, I have observed low-dose dasatinib to be more effective for CNS-related symptoms (e.g., profound, ultra-refractory depression in one patient) and low-dose sunitinib to be more effective for patients whose MCAS phenotype features a strong allergic/anaphylactoid component. Although dasatinib has a propensity at CML-level dosing (~100 mg/d) to drive effusions, I have never seen such problems develop at the 20-40 mg/d dosing level. Still, in a patient with significant pre-existing pulmonary disease, the potential for pulmonary complications would have to be watched especially carefully if dasatinib were to be started.     I have occasionally seen somatostatin prove helpful for refractory non-infectious diarrhea in MCAS.  Usually the long-acting form of the drug (Sandostatin LAR) is used, with dosing typically in the 10-30 mg range (taken subcutaneously every four weeks).     I have heard from an occasional colleague (though not seen reported yet) that ursodiol (standard dosing 300 mg bid) can be helpful for GI symptoms in an occasional MCAS patient, though to date I myself have seen such improvement in only a single patient.     As inferred by its very name, MCAS is a disease principally of inappropriate mast cell activation, not inappropriate mast cell proliferation. As such, it  would seem there is little call in the management of MCAS for the antiproliferative treatments used to treat mastocytosis such as cladribine and interferon. Both drugs have substantial toxicities which also would question their utility in MCAS, principally cytopenias and immunosuppression with cladribine and a flu-like syndrome with interferon which in my experience often don't tachyphylax as significantly or briskly as the manufacturers typically promote. Alpha interferon also bears risks for significant cytopenias, hypothyroidism, and depression. Nevertheless, downregulation of mast cell activation has been seen with alpha interferon.  Thus, one wonders whether a cautious trial of the drug might be worthwhile at some point in otherwise refractory MCAS patients, particularly those whose medication excipient intolerance issues preclude trials of most oral medications. I am unaware of any peer-reviewed literature reporting the use of cytotoxic chemotherapy such as cladribine, or any type of interferon, in the treatment of MCAS. There are preclinical data suggesting CD30-targeting brentuximab may be helpful for cytoreduction and  release downregulation in mastocytosis, but there has been no clinical testing yet in that disease, let alone in MCAS.     Urgent/emergent management of flares of mast cell disease generally include extra dosings (IV if possible) of H1 blockers (e.g., diphenhydramine  mg) and H2 blockers (e.g., famotidine 20-60 mg), possibly also with glucocorticoids (e.g., methylprednisolone 1-2 mg/kg) and possibly also with benzodiazepines (e.g., lorazepam 1-3 mg). Patients are advised to try different formulations of rapid-acting antihistamine products (e.g., liquid formulations, or rapid-dissolving formulations such as Claritin Reditab or Zyrtec Fast-Melt) to identify what is tolerable and works well for them and then to regularly carry such products with them if they frequently suffer  "flares. Of course, epinephrine is always the \"go-to\" drug for anaphylaxis unless the patient is on a beta-blocker, in which glucagon is the \"work-around\" drug. If a mast cell patient has suffered repeated sravani anaphylaxis or severely anaphylactoid states, particularly if the triggers are unclear, it is recommended that the patient always have immediate access to two doses of epinephrine (or glucagon, as just noted), usually via an Epi-Pen or equivalent device. Although The Mastocytosis Society and some experts recommend that *all* patients with (any form of) mast cell disease always carry epinephrine autoinjectors, it is unclear to me -- especially as we are increasingly learning how large and diverse the MCAS population truly is -- whether such a recommendation should indeed be made so globally. Though anaphylaxis obviously can be quickly fatal, and though anaphylaxis is always a risk at some level in (diagnosed and undiagnosed) mast cell disease patients, past behavior of MCAS largely predicts future behavior (at least until a new major stressor occurs), so in my opinion it is acceptable for patients who have never anaphylaxed (or perhaps anaphylaxed only a long time ago to a specific agent which they have since avoided and are likely to continue to be successful in avoiding) to weigh their small (but non-zero) risk for anaphylaxis against the costs, in all the meanings of that word, of carrying epinephrine autoinjectors on their persons for the rest of their lives. Some patients feel Epi-Pen Twinjectors or other autoinjector devices are more convenient than standard traditional Epi-Pens; efforts should be made to accommodate the preferences of the patient who will need to carry the device(s) with him/her for the rest of his/her life. The indications for usage of an epinephrine autoinjector is when the patient can't breathe or can't swallow, and it's important that patients be instructed in the proper use of such " "a device (in general: call for help; lie down flat; inject in one lateral thigh (through clothing is OK) (unless the instructions with her specific device direct an alternative injection approach), then inject again in the contralateral thigh if unimproved after five minutes and help hasn't yet arrived). For obvious reasons, it's very important for patients to read the instructions for their epinephrine autoinjectors as soon as they receive them, and preferably for them to even have a chance to simulate use with a dummy device.     Another drug primarily used for emergent management of MCAS -- particularly in patients with refractory angioedema -- is icatibant (30 mg, injected subcutaneously in the abdomen). The drug can be extremely effective very quickly, but its extreme expense (approximately US$7,000 per dose) perhaps calls for at least a brief trial of routine emergent management (e.g., antihistamines, steroids, benzodiazepines) before resorting to this option.  One must also be cognizant of the fact that icatibant is in the class of peptidergic drugs, and data have begun emerging in the literature suggesting that this class of agents may pose as triggers in some MCAD/MCAS patients.     I'll also note that there have now been a very few case reports in the literature of rituximab working very well to control (at least for several months) otherwise refractory \"idiopathic anaphylaxis\" (IA), which in my opinion almost certainly is a manifestation of MCAS. There also are small studies showing efficacy of immunosuppressive approaches such as with rituximab or cyclophosphamide or cyclosporine in treatment of \"chronic (auto)immune urticaria\" (associated with anti-IgE and/or anti-IgE-receptor autoantibodies).  It is likely that IA and CIU are manifestations of MCAD/MCAS, and thus, whether one applies one of these diagnostic labels or another to such patients, I feel rituximab (or other immunosuppressive approaches as " "briefly suggested above) are reasonable, but patients and their treating physicians need to be cognizant that rituximab is not curative and is expensive and that it takes roughly one year to reconstitute B-cell immunity after rituximab treatment. I'll also note that it's the treatment successes, not failures, that tend to get reported in the case literature, and I have heard from some physicians that their trials of rituximab for a few of their IA/MCAS patients have shown no benefit at all.  I, too, have seen more failures of immunosuppressant treatment of MCAD/MCAS than successes, but the occasional success of course is gratifying when many other treatments have failed.     Perioperative management is similar to emergent management. The surgeon and anesthesiologist should be aware of the patient's mast cell disease and should read any of the many treatises in the literature on the subject (a link to one such article is provided below). Perioperative guidance for professionals is also available on the website of The Mastocytosis Society at http://www.tmsforacure.org.     A medical alert bracelet is often helpful to emergency personnel. As most such personnel are not presently trained regarding mast cell disease, the first word on the bracelet should be \"anaphylaxis.\"     Although it's likely that most or all of an MCAS patient's various problems are due directly or indirectly to the MCAS, I'll also note the imperative that MCAS patients not assume that major exacerbations of prior problems, or emergence of new problems, are necessarily due to their mast cell disease. Serious illness should always first undergo standard evaluation as appropriate for the particulars of that illness, and only if no other etiology is identified, and if the problem is one that can reasonably be attributed to mast cell disease, is it then appropriate to attribute the problem, by default, to the mast cell disease. Even if an identified " "problem is indeed attributable (directly or indirectly) to mast cell disease, any standard/classic therapy that exists for the problem should be applied (concurrently with MCAS-directed therapy). For example, mast cell disease can cause myocardial infarction via multiple mechanisms, but standard myocardial infarction therapy is certainly the priority over mast-cell-directed therapy for any myocardial infarction being driven by mast cell disease. The tendency of an MCAS patient to blame all ills directly on the mast cell disease, and the desire in such patients to focus solely on MCAS-directed therapy, may be understandable, and MCAS patients typically have so often been disserved in urgent and emergency care facilities that their desire to avoid them may also be understandable, but staying home and trying to initially treat acute severe chest pain with Benadryl is obviously a Very Bad Idea that could easily and quickly prove fatal.     It's so important that I will say it again: there is no method at present to provide better guidance as to which medications should be tried sooner vs. later, there is no \"right\" or \"wrong\" decision as to which intervention to try next, and there simply is no substitute for patience, persistence, and a methodical approach -- on the parts of both the patient and the physician -- in the journey toward identifying optimal therapy for the individual patient. Some patients are so turner as to identify substantially helpful therapy within the first few months of beginning the journey; other patients require years, and trials of more than a dozen medications, before a truly beneficial one is found. Enrollment in clinical trials should be considered when such trials become available.     Mast cell disease often drives bone changes (far more often osteopenia or osteoporosis than osteosclerosis, but sometimes both osteopenia/osteoporosis and osteosclerosis at different sites in the same patient " "at the same time). It is reasonable to check baseline bone densitometry upon diagnosis of a mast cell disorder if not already recently done. If the patient is found to be osteopenic or osteoporotic, routine vitamin D and calcium treatment should be tried first, but if follow-up bone densitometry proves such basic treatment to be unhelpful, then bisphosphonate or anti-RANKL therapy is warranted. (I'll note I've often seen MCAS patients prove intolerant of bisphosphonates, but I've never seen bisphosphonate-intolerant MCAS patients prove intolerant of (or unresponsive to) denosumab.) Of course, the single best approach to managing bone disease consequent to MCAS is to control the MCAS as best as possible. I should also note that use of bisphosphonates or denosumab requires pre-treatment clearance by a dentist due to the possibility of these drugs causing (potentially quite morbid, even fatal) osteonecrosis of the jaw (ONJ) in patients with poor dentition or recent dental work.     A priori, the odds of eventually achieving the typical (palliative) MCAS management goal (of finding a regimen that will help the patient feel significantly better than the pre-treatment baseline the majority of the time) in any given MCAS patient are as good as in any other patient (that is to say, pretty good), but only time will tell the ultimate outcome for the individual patient.     In case the following might be helpful, here are a few references to peer-reviewed literature and other educational material about MCAS (authorial bias acknowledged):     1. A short review that provides the \"Molderings 2011\" diagnostic criteria: [email or web address]://[email or web address]/content/[email or web address]. Also, the Valent 2012 criteria are available at [email or web address], but I'll note I strongly favor the Molderings 2011 criteria over the Valent 2012 criteria since in my experience the latter fit the observed and reported biology of " the disease less well than the former.     2. An updated short review from 2014: [email or web address].     3. An approach to diagnosis from 2014: [email or web address].     4. A comprehensive review chapter from 2013: https://[email or web address]/catalog/product_info.php?products_id=80504.     5. A transcribed grand rounds (including slides) from 2011 ([facility]): [email or web address].     6. A video of a grand rounds from 2014 ([facility]): see the August 6, 2014 entry under Allergy Webinars at [email or web address].     7. There are many reviews in the peer-reviewed medical literature about perioperative management of mast cell disease (all focused on mastocytosis, but the principles are the same for MCAS). At present, the most recent is Dewachter et al., Anesthesiology 2014, DOI 10.1097/ALN.[id number] (this may be freely available to some at [email or web address].pubs.[email or web address]/article.aspx?[email or web address]). A shorter discussion of perioperative management is available from The Mastocytosis Society at http://[email or web address]/ChroniclesSE.pdf.     8. I should note, too, that given the fundamental precept that mast cells are present, and contribute to regulation of function, in *all* systems/organs/tissues, it is most certainly the case that MCAS can cause a wide range of neuropsychiatric symptoms and disorders, too. Prior to diagnosis, most MCAS patients are thought by at least some of their providers, family, and acquaintances to be psychosomatic. Along with a number of co-authors, I recently published a review of the neuropsychiatric aspects of MCAD. It's not freely available, but the access point is http://www.sciencedirect.com/science/article/pii/[email or web address]. I'm happy to provide a copy of this for private  "use upon request.     9. My early experience with continuous diphenhydramine infusion is available as an American Society of Hematology 2015 abstract at [email or web address]://[email or web address].     10. A comprehensive review of pharmacologic therapy of systemic mast cell activation disease has been published recently (Blanca POWELL et al., Pharmacological treatment options for mast cell activation disease, [employer] [facility] [employer]ol 2016 Apr 30, DOI: 10.1007/[id number]).     In summary, I think there are many therapeutic directions that could be legitimately pursued in this patient, and there simply are no data at present to say, or even suggest, that any one particular sequencing of medication trials is \"more right\" or \"more wrong\" than any other sequencing. Determination of a particular sequence to be pursued in this patient will depend on the treating provider's best clinical sense of the matter as influenced by present and emerging symptoms and findings, patient desires, and treatment accessibility (i.e., vis-a-vis third-party payer coverage). Again, there is no \"right\" or \"wrong\" decision here regarding which medication to try next. I can only emphasize the importance of having the patience to make just one change in the regimen at a time whenever possible, and in truth the only \"wrong\" decision that can be made here is to stop trying altogether (unless, of course, the patient decides, for whatever reason, she just does not want to pursue any more medication trials).     All of the above having been said, I think that given the particulars of her presentation, it might be best to focus aggressively at first on identifying an optimal full (H1 + H2) histamine receptor blocker regimen for her.  While an optimal antihistamine regimen is being established, I think that a further local review of her case (perhaps by an internist or rheumatologist?) with respect to the potential presence of any " "other inflammatory diseases contributing to her normocytic anemia would be reasonable; I'd give particular thought, given her dietary constraints, to the potential for micronutrient deficiencies, and a good bit of testing (perhaps on guidance from consultation with a nutritional gastroenterologist?) in this regard might be reasonable (e.g., vitamins C and D, B1, B2, B3, B6, E); I'm not sure I see signs here of any metal deficiencies; also, again, a quantitative immunoglobulin profile (at least IgG, IgA, IgM, and IgE) should be run if not already done at some point, and a serum protein electrophoresis might even be reasonable to see if there might be a monoclonal protein perhaps giving a hint of amyloidosis).  Meanwhile, back on the therapeutic front, after an optimal antihistamine regimen is established, then given her inflammatory issues, trials of NSAIDs (e.g., aspirin first, then celecoxib if intolerant of or unresponsive to aspirin) would be reasonable.  Her anxiety issues (which, like all of the rest of her symptoms, are well within the [facility]) suggest a trial of (low-dose, but regularly dosed) benzodiazepines (as extensively detailed above) would be reasonable.  For GI tract issues, medications beyond antihistamines that might be helpful include (but certainly are not limited to) oral cromolyn, montelukast, compounded oral ketotifen, and low-dose benzodiazepines.  If oral cromolyn is helpful, other forms of cromolyn (e.g., nasal, ophthalmic, nebulized) might also help.  On the more expensive end of the therapeutic range, it would not be unreasonable to try omalizumab \"sooner than later\" given her wide range of reactivities.  Clearly, there are many options, and it will be important for her to try just one at a time as much as possible, and it also will be best for her to work principally with just one of her physicians in stepping through trials of various medications for this disease, " "though other local consultants certainly can be engaged as necessary for trials of select medications with which her lead physician may be unfamiliar or otherwise uncomfortable in personally prescribing (e.g., imatinib).     The previously established follow-up plan remains sufficient. The patient understands that out of professional courtesy I will not \"cold-call\" or \"cold-email\" any health care provider about her. However, I will be happy to respond to any provider's *direct* request to me for additional input in her case. I can best be contacted either via e-mail at [email or web address], or a phone call can be scheduled via office staff here at [phone], or I can be paged via our [facility]'s paging  at [phone] if emergency consultation is felt necessary. Also, my clinic nurse coordinator, Clary Regan RN, can be contacted at [phone]; patients wishing to contact her electronically should preferentially use the \"[place]\" portion of the electronic medical record system here. Of note, at present I do not provide phone-based (or video-based) follow-up visits to patients, and the phone numbers I've provided here are intended for use by the patient's physicians and other providers. A patient who calls me to report new problems (or severe exacerbations of old problems) and request guidance (or request provision of guidance to her local provider) should expect me to redirect her to her local providers to first be properly assessed in standard fashion for the particular problems and complaints being experienced at that time, and if, after such evaluation has been performed, the provider desires to discuss the situation further with me, I will be happy to engage in that dialogue if, again, the provider directly requests my input. As stated above, mast cell disease does not render one immune to other diseases, and the patient and all of her providers must remain vigilant to the possible emergence of " other illnesses (whether ultimately attributable to her mast cell disease or not) for which standard (non-mast-cell-directed) treatments have been defined.     I spent another two hours writing this addendum, but as this was not face-to-face time with the patient, no additional billing was submitted.      Typed, reviewed, and electronically signed by: Britton Rodarte M.D.     DT: 03/25/17 06:47 PM    cc: 1. Terrell Boss M.D. ([address] [place] [address], [phone], fax [phone])  2. Fe Cole M.D. ([facility], [address], [place] [address], [phone], fax [phone])  3. Please also mail a copy to the patient at her home address as listed in the system.    Britton Rdoarte MD

## 2017-02-28 NOTE — MR AVS SNAPSHOT
After Visit Summary   2/28/2017    Virginia Sheffield    MRN: [id number]           Patient Information     Date Of Birth          1975        Visit Information        Provider Department      2/28/2017 11:00 AM Britton Rodarte MD [facility]        Today's Diagnoses     Chronic fatigue    -  1       Follow-ups after your visit        Follow-up notes from your care team     Return in about 1 year (around 2/28/2018).      Your next 10 appointments already scheduled     Feb 27, 2018  2:00 PM CST   (Arrive by 1:45 PM)   Return Visit with Britton Rodarte MD   [facility] ([facility])    [address] [place] [address]  2nd [facility] [address] [phone]              Future tests that were ordered for you today     Open Future Orders        Priority Expected Expires Ordered    Immunoglobulins A G and M Routine 2/28/2017 2/28/2018 2/28/2017    IgE Routine 2/28/2017 2/28/2018 2/28/2017            Who to contact     If you have questions or need follow up information about today's clinic visit or your schedule please contact [facility] directly at [phone].  Normal or non-critical lab and imaging results will be communicated to you by MyChart, letter or phone within 4 business days after the clinic has received the results. If you do not hear from us within 7 days, please contact the clinic through [employer] or phone. If you have a critical or abnormal lab result, we will notify you by phone as soon as possible.  Submit refill requests through [facility] [employer] or call your pharmacy and they will forward the refill request to us. Please allow 3 business days for your refill to be completed.          Additional Information About Your Visit        MyChart Information     [facility] gives you secure access to your electronic health record. If you see a primary care provider, you can also send messages to your care team and make  "appointments. If you have questions, please call your primary care clinic.  If you do not have a primary care provider, please call [phone] and they will assist you.        Care EveryWhere ID     This is your Care EveryWhere ID. This could be used by other organizations to access your [place] medical records  [id number]        Your Vitals Were     Pulse Temperature Respirations Height Last Period Pulse Oximetry    75 96.8  F (36  C) (Oral) 16 1.789 m (5' 10.43\") 02/17/2017 (Exact Date) 100%    BMI (Body Mass Index)                   17.22 kg/m2            Blood Pressure from Last 3 Encounters:   02/28/17 102/60    Weight from Last 3 Encounters:   02/28/17 55.1 kg (121 lb 8 oz)               Primary Care Provider    None Specified       No primary provider on file.        Thank you!     Thank you for choosing [facility]  for your care. Our goal is always to provide you with excellent care. Hearing back from our patients is one way we can continue to improve our services. Please take a few minutes to complete the written survey that you may receive in the mail after your visit with us. Thank you!             Your Updated Medication List - Protect others around you: Learn how to safely use, store and throw away your medicines at www.disposemymeds.org.          This list is accurate as of: 2/28/17  8:33 PM.  Always use your most recent med list.                   Brand Name Dispense Instructions for use    albendazole 200 MG Tabs tablet    ALBENZA     Take 200 mg by mouth 2 times daily (with meals)       amitriptyline 10 MG tablet    ELAVIL     Take 20 mg by mouth daily       AZELAIC ACID EX      Apply topically 2 times daily       cetirizine 10 MG tablet    zyrTEC     Take 10 mg by mouth 2 times daily       CROMOLYN SODIUM PO      Take 200 mg by mouth 3 times daily       doxepin 10 MG/ML (HIGH CONC) solution    SINEquan     Take 12 mg by mouth daily       gabapentin 300 MG capsule    " NEURONTIN     Take 100 mg by mouth 3 times daily       Ketotifen Fumarate Powd      Take 2 mg by mouth 3 times daily       LORazepam 0.5 MG tablet    ATIVAN     Take 0.5 mg by mouth daily as needed       metroNIDAZOLE 0.75 % topical gel    METROGEL     Apply topically 2 times daily       NATURE-THROID 65 MG Tabs   Generic drug:  Thyroid      Take 81 mg by mouth daily 1.25 grains per day

## 2017-02-28 NOTE — LETTER
"2017       RE: Virginia Sheffield  [address] [facility] [address]     Dear Colleague,    Thank you for referring your patient, Virginia Sheffield, to the [facility]. Please see a copy of my visit note below.    Patient: Virginia Sheffield   (MRN [id number])   Encounter Date: 2017  Referring: Terrell Boss M.D. ([address] [place] [address], [phone], fax [phone]), Fe Cole M.D. ([facility], [address], [place] [address], [phone], fax [phone])  Attending: Britton Rodarte M.D.     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  At the beginning of the encounter, I reviewed all available chart data, consisting principally of about 40 pages of outside hardcopy medical records, the salient points of which I've incorporated into the narrative below.  I also reviewed a few e-mails the patient, Dr. Boss, and I had exchanged in .  The history here is a bit complex, and it's probably best to just present it all chronologically, blending HPI and PMH as follows.  Note she presented her history in a bit of a scattershot fashion, and I've done my best to rearrange it chronologically.  Her history of chronic multisystem polymorbidity of generally inflammatory and allergic themes is literally life-long, as she knows she was a \"colicky\" baby from birth and quickly proved \"allergic to breast milk\" and had to be fed with soy-based formula.  She recalls gait problems while learning to walk, and she required braces in toddlerhood.  She knows she has \"always\" (going back at least to her earliest memories) been anemic, though no cause for this has ever been found.  She recalls tonsillectomy and adenoidectomy was performed at 7 due " "to \"frequent post-nasal drip\" (though I suspect upper respiratory tract infection problems were more significant than just frequent post-nasal drip to get to the point of pursuing surgery).  Menarche came at 12 and was notable only for possible menorrhagia for which she then started oral contraceptives a year or two later.  Chronic sinusitis emerged at 15, though she attributes this to insufficient sleep, noting that she has \"always\" (back to her earliest childhood memories) felt sick for not having gotten enough sleep, and she says that once she started getting more sleep, the sinusitis improved.  She recalls developing \"sensitivities\" to various tastes and odors in her late teens, and these problems have slowly but steadily worsened ever since, though they seemed to markedly worsened with her second pregnancy.  Her first anxiety attack came upon graduating from college and continued occasionally after that point, markedly worsening with the second pregnancy (see below).  At 26 she developed sacroiliitis due to a lot of driving in her job as a , and gastritis emerged around this time, too.  Her first pregnancy came at 30 but miscarried around 4-6 weeks, attributed to hypothyroidism discovered at that point.  Her second pregnancy came at 31 and coursed well except for premature labor at 32 weeks, a bout of bronchitis needing antibiotics, and then delivery at 35 weeks.  She says she suffered post-partum depression due to insufficient sleep.  Anxiety, too, re-emerged in this post-partum period (she knows lorazepam helps, but she only uses it occasionally), and insomnia developed in spite of her exhaustion.  At 33 she recognized that capsicum peppers were a principal trigger of her gastritis, so she deleted them from her diet and saw the gastritis remit.  As her daughter was growing up, both of them \"always got whatever was going around.\"  She says she was sick every 1-2 months because of this.  At 36 she was " "diagnosed with rosacea.  At 37 she began developing excessive gas and episodes of acute diarrhea.  She says testing of a stool sample showed she had \"a whipworm parasite,\" which was treated and symptoms resolved.  Soon after this, though, she became hyperthyroid, and it took nine months of frequent adjustments to her thyroid medication to finally stabilize her thyroid function, during which time she was hypersensitive to assorted sensory stimuli.  She also says she has been hypersensitive to assorted sensory stimuli and stressors since her second pregnancy.  Also around age 37 she was started on a number of nutritional supplements but quickly developed palpitations, anxiety, and irritability, especially premenstrually.  At 37 she developed left ankle pain of unclear etiology.  At 38 she developed a frozen right shoulder of unclear cause (possibly due to a \"tiny\" labral tear, she says).  At 38 she learned that ingestion of a high-fat-content meal caused nausea.  Around age 38 she had an infectious episode of some sort and needed antibiotics but found the drug caused the same whole-body burning sensation (including a \"buzzing\" sensation along the length of the spinal column, an \"anxiety in my spine\") that re-emerged and became constant after her third pregnancy.  The same sensation also has happened on eating barbeque at age 39.  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  She says she has been unable to eat protein since age 40.  She also notes that at age 40 she felt the best she had felt in a long time.  She decided to pursue a third pregnancy, but this, too, miscarried at about 4-6 weeks, attributed at the time to her age.  Within days after the miscarriage, she was started on a number of new supplements and went on a [place] vacation.  On the return flight, her legs started itching, and then two days later, while " "wearing a new (and unwashed) shirt from [place], a \"big red pustular burning rash\" about her bilateral lower anterior ribs erupted.  This resolved quickly with hydrocortisone cream, but five days later the same burning sensation returned throughout her body, albeit without any visible rash.  From that point on, eating would reliably intensify symptoms.  Dietary changes were unhelpful.  She began consulting many physicians including at [place] and eventually the [facility] ([place]).  She says no clear diagnosis was ever made, though at one point she was diagnosed with an eating disorder, and then a subsequent consultation with a [place] psychiatrist refuted that diagnosis.  She says she has developed many more dietary sensitivities since.  She notes her acupuncturist and her  were the first to suspect MCAD might be at the root of her problems, leading to the present evaluation.  She says a low histamine diet has been only slightly helpful.  She says she is down to just 14 foods.  She says she often feels as if her brain is \"on fire.\"  She notes she has lost 20 pounds (to 115 pounds) in the last two years.    On a full review of systems, although she denies any issues with flushing, sweats, tinnitus, epistaxis, easy bleeding, sinonasal congestion, coryza, post-nasal drip, throat irritation, dysphagia, chest pain, gastroesophageal reflux, vomiting, diarrhea, abdominal pain, bloating, edema, adenopathy, syncope, hair problems, or hives, she endorses a wide range of other, chronic/recurrent, episodic/intermittent and/or waxing/waning issues including subjective fevers and presyncope on prolonged standing in childhood, feeling cold much of the time, fatigue (often to the point of exhaustion), malaise, headaches, diffusely migratory aching/pain, diffusely migratory pruritus, unprovoked fluctuations in weight and appetite, irritation of the eyes, acute brief inability to focus vision, easy bruising, a burning " "sensation in her ears, occasional intranasal sores, burning tongue, proximal dysphagia, a burning sensation in her lungs, post-prandial palpitations, nausea, constipation, urinary hesitancy when flying, diffusely migratory weakness, diffusely migratory edema, diffusely migratory tingling/numbness, orthostatic and non-orthostatic presyncope, cognitive dysfunction (particularly memory, concentration, and word-finding), deterioration of dentition despite good attention to dental hygiene, brittle nails, diffusely migratory rashes (typically patchy macular erythema), insomnia, anxiety, depression, modest joint hypermobility, and poor healing in general.  Complete ROS o/w neg.    Family history is notable for a father who is a recovering alcoholic and who has \"always\" had \"digestive issues\" and who had a bout with idiopathic urticaria which resolved, a brother with allergies, a mother with allergies and type 2 diabetes mellitus and obesity and heart disease requiring a pacemaker, \"digestive issues,\" arthritis, \"significant body pain,\" hypersensitivity to odors, intolerance of alcohol, and death recently from complications from coronary artery bypass surgery, and a daughter with food sensitivities and lichen sclerosis. The patient denies any history of smoking, significant alcohol use (in fact, she flushes to red wine and gets an \"easy hangover\" upon drinking even a tiny quantity), or illegal substance use except for marijuana use in high school and college which did help her feel better.    She lists her current medications as azelaic acid topically bid, cetirizine 10 mg bid (\"definitely\" helping), generic oral cromolyn 200 mg tid, compounded oral ketotifen 2 mg tid (\"definitely\" helps, though the effect only lasts 1-2 hours), lorazepam 0.5 mg prn, amitriptyline 20 mg qd, Nature Thyroid 81 mg qd, doxepin solution 12 mg qd, gabapentin 100 mg tid, metronidazole 0.75% topical gel bid, and a course presently of albendazole " "200 mg bid to treat leishmaniasis reportedly diagnosed recently in a stool sample.  She lists her current allergies as rash to adhesive tape, hypertension to epinephrine, and urticarial rash to penicillins.    Exam finds a tired, abnormally thin woman for her height, concerned and seldom expressing a smile but in no apparent acute distress, pleasant enough given her concern, cooperative, fully alert and oriented, independently ambulatory, presenting with her obviously appropriately concerned/supportive  who was taking notes throughout the encounter and who had an excellent understanding of her history (his memory clearly eclipsed hers at several points in the encounter).  Vitals per chart, notable for /60, pulse 75, weight 122 pounds.  Key findings are her tired, thin, unhappy, vaguely chronically ill general appearance, HEENT benign, no plethora/pallor/diaphoresis/jaundice/rash/bleeding/bruising, neck supple, no JVD or thyromegaly or carotid bruits, no palpable adenopathy or tenderness at any of the usual node-bearing sites, clear lungs, regular heart with no adventitious sounds, abdomen benign, just the very slightest trace of distal bilateral lower extremity pitting edema, neuro grossly intact except for tingling \"all over.\"  On a light scratch test on the upper back, moderately bright dermatographism (erythroderma only, no hives) quickly emerged and was fully sustained (perhaps even brighter?) when last checked 10 minutes later.     Review of available lab data was notable for a wide range of normal labs at [place] in 6/15 including tryptase, whole blood histamine, CMP, iron studies, folate, B12, and 24-hour urinary N-methylhistamine and 11-beta-prostaglandin-F2-alpha (also negative [id number] mutation analysis in peripheral blood), plus a similar range of normal labs at [place] in 10/15 (also a negative 24-hour urinary 5-HIAA at this time).  In 7/15 chromogranin A was normal, but transforming growth " "factor beta 1 was roughly triple the upper limit of normal.  In 9/15 another set of routine chemistries and the above-noted mast cell  labs were again normal.  The patient and her  note there is uncertainty as to whether the specimens were handled properly by the various labs except in the most recent round of testing.  Duodenal biopsy obtained at [place] in 10/15 was read as normal (only routine staining was performed).  Skin biopsy at [facility] (unclear date) showed \"urticarial hypersensitivity reaction with slight spongiosis, focally excoriated, with a perivascular and interstitial infiltrate including lymphocytes and eosinophils.\"    Labs done here yesterday included a 100% normal CMP except for mild hypoglycemia (60 mg/dl) and minimal hypermagnesemia, a CBCD notable only for mild normocytic anemia (Hgb 10.3 g/dl), and normal PT/PTT.    A/P: Kudos to the patient's acupuncturist and the patient's , because I think they're likely right in their suspicion that a mast cell activation disorder (MCAD) -- and far more likely the far more prevalent (if only recently recognized) type termed mast cell activation syndrome (MCAS) than the rare (but long recognized) type termed mastocytosis -- is at the root of most or all of the patient's chronic multisystem polymorbidity of generally inflammatory and allergic themes. Beyond all the other diseases already ruled out by the great extent of testing she's undergone to date, I don't know of any other human disease that comes anywhere near as close as MCAS does to accounting, directly or indirectly, for the full range and chronicity of all the symptoms and findings here. (Of note, too, I'm not saying that any of her prior diagnoses are wrong (other than any assertions of psychosomatism which might have been made along the way). It's just that each of them accounts for only one subset or another of all that's gone on in her, while MCAS can account for most or all of " everything that's been going on in her.) All of that said, she needs objective laboratory evidence of improperly behaving mast cells (note it's possible the results from some of her earlier mast cell  determinations might be false negatives due to specimen handling issues), toward which end I ordered the range of testing I typically check in assessing for MCAS, namely, a CBCD, CMP, magnesium, PT/PTT, chilled serum tryptase and chromogranin A, chilled plasma prostaglandin D2 and histamine and heparin, chilled random urinary prostaglandin D2 and N-methylhistamine and leukotriene E4 and 2,3-dinor 11-beta-prostaglandin-F2-alpha, and chilled 24-hour urinary prostaglandin D2 and N-methylhistamine and leukotriene E4 and 2,3-dinor 11-beta-prostaglandin-F2-alpha. I also ordered an anti-IgE IgG and an anti-IgE receptor antibody, which won't be useful diagnostically but may influence certain therapeutic decisions down the road if MCAS is proven. We confirmed she has abstained from confounding use of proton pump inhibitors (PPIs) and non-steroidal anti-inflammatory drugs (NSAIDs) for the prior 5+ days. We provided her careful written and verbal instructions regarding the 24-hour urine collection including the importance of continuous chilling of the specimen throughout collection and transport. She understands the various reasons why a single round of  testing might yield all negative results, and she understands that if this happens, it of course doesn't even begin to invalidate/refute/negate even a single element of her history (which strongly argues for a mast cell disorder). Her history is what it is, so if we get a negative round of testing, I'll simply recommend repeat testing (which she'll of course need to pursue locally), albeit with specimen collection at that point preferably deferred to a particularly symptomatic day. I also asked her to work with our clinic nurse coordinator, Clary Regan,  "RN, to try to arrange for her old duodenal biopsy to be reassessed (either locally or here) with  staining, as mast cells can't be seen with routine H&E staining and I've often seen \"normal\" or \"mildly chronically inflamed\" GI tract biopsies in MCAS patients \"light up\" on  staining revealing mast cell disease. If 2-3 rounds of blood and urine testing yields all negative results, and if restaining of any available old biopsies either can't be done for some reason or yields negative results, the \"backup plan\" will be to pursue a fresh set of bidirectional endoscopies to obtain biopsies throughout the luminal GI tract for pathologic evaluation specifically (using  staining at a minimum) for increased (>20/hpf) numbers of mast cells (as often seen mildly-moderately in MCAS, though not to anywhere near the degree (or patterns) seen in mastocytosis).     Although I cautioned her that she doesn't have a definitive diagnosis of MCAS yet, we did engage in extended discussion today about general aspects of MCAS including its essential nature of chronic multisystem polymorbidity of a generally inflammatory theme, the availability of many therapies shown helpful in various MCAD/MCAS patients, the good likelihood of (eventually) finding therapy that will help her achieve the goal of feeling significantly better than the pre-treatment baseline the majority of the time, and the present frustrating absence of any biomarkers that can help predict which of the many available, potentially helpful therapies is most likely to benefit the individual patient. She understands that if we are able to secure a diagnosis of MCAS, she will be dependent on pursuing -- in patient, persistent, and methodical fashion, trying as hard as possible to make just one change at a time -- trials of the various therapies (which, again, lacking predictive biomarkers, we generally pursue in order of escalating cost). She understands that once " "all test results are available about a month from now, I will write an addendum to this note (to again be distributed to all of her physicians listed below) providing my interpretation of the results and recommendations for next steps.     I told her that the only therapy I am willing to empirically recommend in a patient with suspected, but not yet proven, MCAS is a full (H1 + H2) histamine receptor blockade as she is presently doing.  She understands that some patients find that alternative H1 or H2 blockers serve them better, some patients find that tid dosing serves them better than bid dosing, and some patients find that slightly higher dosages (e.g., 20-30 mg rather than 10 mg of cetirizine, or 150 mg rather than 75 mg of ranitidine) serve them better than lower dosages. She will need to \"experiment,\" taking 2-4 weeks with each specific combination of drug/dose/frequency to understand what it can accomplish for her in controlling this disease of naturally waxing/waning intensity. I also cautioned her that MCAD/MCAS patients have quite a predilection to adversely react not necessarily to the active ingredients in their medications but rather to the excipients (fillers, binders, dyes, preservatives, etc.) in their medications. As such, if she experiences an adverse reaction very shortly after beginning an ordinarily well tolerated medication (as is certainly the case with the H1 and H2 blockers), excipient reactivity must be suspected and she should promptly work with her pharmacist to review the medication's ingredient list, try to identify the likely offending ingredient, and try one or more alternative formulations of the medication which don't share any of the offending formulation's excipients. She appears to understand.     She lives far too distantly to return here with any significant frequency.  She understands she will remain 100% dependent on her local providers for management of all acute and chronic " issues with the disease.  I'll see her roughly annually for strategic reassessments.     As is usually the case with initial consultations for mast cell disease, this was a long encounter, 135 minutes in total, with 70 minutes spent in counseling. The patient and her  indicated that all of their questions at this time had been answered to their satisfaction, and they expressed appreciation for the time I had given them.      Typed, reviewed, and electronically signed by: Britton Rodarte M.D.     DT: 02/28/17 08:33 PM    cc: 1. Terrell Boss M.D. ([address] [place] [address], [phone], fax [phone])  2. Fe Cole M.D. ([facility], [address], [place] [address], [phone], fax [phone])    Again, thank you for allowing me to participate in the care of your patient.      Sincerely,    Britton Rodarte MD

## 2017-02-28 NOTE — PROGRESS NOTES
RN met with pt and , Edwin today.  Pt started 24 hour urine collection today.  Pt/ denied any questions at this time regarding instructions for 24 hour urine testing.  Reviewed it is commone to have to repeat lab testing.  Pt/ report she had endoscopy procedure with biopsies at [facility] in October, 2015.  They gave Dr. Rodarte report.  Pt signed release of inforamtion to obtain tissue for  testing.  RN explained how possible elevated results for  staining may assist with diagnosis when they asked.

## 2017-02-28 NOTE — PROGRESS NOTES
"Patient: Virginia Sheffield   (MRN [id number])   Encounter Date: 2017  Referring: Terrell Boss M.D. ([facility] [place], [place], [phone], fax [phone]), Fe Cole M.D. ([facility], [address], [place] [address], [phone], fax [phone])  Attending: Britton Rodarte M.D.     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  At the beginning of the encounter, I reviewed all available chart data, consisting principally of about 40 pages of outside hardcopy medical records, the salient points of which I've incorporated into the narrative below.  I also reviewed a few e-mails the patient, Dr. Boss, and I had exchanged in .  The history here is a bit complex, and it's probably best to just present it all chronologically, blending HPI and PMH as follows.  Note she presented her history in a bit of a scattershot fashion, and I've done my best to rearrange it chronologically.  Her history of chronic multisystem polymorbidity of generally inflammatory and allergic themes is literally life-long, as she knows she was a \"colicky\" baby from birth and quickly proved \"allergic to breast milk\" and had to be fed with soy-based formula.  She recalls gait problems while learning to walk, and she required braces in toddlerhood.  She knows she has \"always\" (going back at least to her earliest memories) been anemic, though no cause for this has ever been found.  She recalls tonsillectomy and adenoidectomy was performed at 7 due to \"frequent post-nasal drip\" (though I suspect upper respiratory tract infection problems were more significant than just frequent post-nasal drip to get to the point of pursuing surgery).  Menarche came at 12 and was notable only " "for possible menorrhagia for which she then started oral contraceptives a year or two later.  Chronic sinusitis emerged at 15, though she attributes this to insufficient sleep, noting that she has \"always\" (back to her earliest childhood memories) felt sick for not having gotten enough sleep, and she says that once she started getting more sleep, the sinusitis improved.  She recalls developing \"sensitivities\" to various tastes and odors in her late teens, and these problems have slowly but steadily worsened ever since, though they seemed to markedly worsened with her second pregnancy.  Her first anxiety attack came upon graduating from college and continued occasionally after that point, markedly worsening with the second pregnancy (see below).  At 26 she developed sacroiliitis due to a lot of driving in her job as a , and gastritis emerged around this time, too.  Her first pregnancy came at 30 but miscarried around 4-6 weeks, attributed to hypothyroidism discovered at that point.  Her second pregnancy came at 31 and coursed well except for premature labor at 32 weeks, a bout of bronchitis needing antibiotics, and then delivery at 35 weeks.  She says she suffered post-partum depression due to insufficient sleep.  Anxiety, too, re-emerged in this post-partum period (she knows lorazepam helps, but she only uses it occasionally), and insomnia developed in spite of her exhaustion.  At 33 she recognized that capsicum peppers were a principal trigger of her gastritis, so she deleted them from her diet and saw the gastritis remit.  As her daughter was growing up, both of them \"always got whatever was going around.\"  She says she was sick every 1-2 months because of this.  At 36 she was diagnosed with rosacea.  At 37 she began developing excessive gas and episodes of acute diarrhea.  She says testing of a stool sample showed she had \"a whipworm parasite,\" which was treated and symptoms resolved.  Soon after this, " "though, she became hyperthyroid, and it took nine months of frequent adjustments to her thyroid medication to finally stabilize her thyroid function, during which time she was hypersensitive to assorted sensory stimuli.  She also says she has been hypersensitive to assorted sensory stimuli and stressors since her second pregnancy.  Also around age 37 she was started on a number of nutritional supplements but quickly developed palpitations, anxiety, and irritability, especially premenstrually.  At 37 she developed left ankle pain of unclear etiology.  At 38 she developed a frozen right shoulder of unclear cause (possibly due to a \"tiny\" labral tear, she says).  At 38 she learned that ingestion of a high-fat-content meal caused nausea.  Around age 38 she had an infectious episode of some sort and needed antibiotics but found the drug caused the same whole-body burning sensation (including a \"buzzing\" sensation along the length of the spinal column, an \"anxiety in my spine\") that re-emerged and became constant after her third pregnancy.  The same sensation also has happened on eating barbeque at age 39.  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  She says she has been unable to eat protein since age 40.  She also notes that at age 40 she felt the best she had felt in a long time.  She decided to pursue a third pregnancy, but this, too, miscarried at about 4-6 weeks, attributed at the time to her age.  Within days after the miscarriage, she was started on a number of new supplements and went on a [place] vacation.  On the return flight, her legs started itching, and then two days later, while wearing a new (and unwashed) shirt from [place], a \"big red pustular burning rash\" about her bilateral lower anterior ribs erupted.  This resolved quickly with hydrocortisone cream, but five days later the same burning sensation returned " "throughout her body, albeit without any visible rash.  From that point on, eating would reliably intensify symptoms.  Dietary changes were unhelpful.  She began consulting many physicians including at [facility] and eventually the [facility] ([place]).  She says no clear diagnosis was ever made, though at one point she was diagnosed with an eating disorder, and then a subsequent consultation with a [facility] psychiatrist refuted that diagnosis.  She says she has developed many more dietary sensitivities since.  She notes her acupuncturist and her  were the first to suspect MCAD might be at the root of her problems, leading to the present evaluation.  She says a low histamine diet has been only slightly helpful.  She says she is down to just 14 foods.  She says she often feels as if her brain is \"on fire.\"  She notes she has lost 20 pounds (to 115 pounds) in the last two years.    On a full review of systems, although she denies any issues with flushing, sweats, tinnitus, epistaxis, easy bleeding, sinonasal congestion, coryza, post-nasal drip, throat irritation, dysphagia, chest pain, gastroesophageal reflux, vomiting, diarrhea, abdominal pain, bloating, edema, adenopathy, syncope, hair problems, or hives, she endorses a wide range of other, chronic/recurrent, episodic/intermittent and/or waxing/waning issues including subjective fevers and presyncope on prolonged standing in childhood, feeling cold much of the time, fatigue (often to the point of exhaustion), malaise, headaches, diffusely migratory aching/pain, diffusely migratory pruritus, unprovoked fluctuations in weight and appetite, irritation of the eyes, acute brief inability to focus vision, easy bruising, a burning sensation in her ears, occasional intranasal sores, burning tongue, proximal dysphagia, a burning sensation in her lungs, post-prandial palpitations, nausea, constipation, urinary hesitancy when flying, diffusely migratory weakness, diffusely " "migratory edema, diffusely migratory tingling/numbness, orthostatic and non-orthostatic presyncope, cognitive dysfunction (particularly memory, concentration, and word-finding), deterioration of dentition despite good attention to dental hygiene, brittle nails, diffusely migratory rashes (typically patchy macular erythema), insomnia, anxiety, depression, modest joint hypermobility, and poor healing in general.  Complete ROS o/w neg.    Family history is notable for a father who is a recovering alcoholic and who has \"always\" had \"digestive issues\" and who had a bout with idiopathic urticaria which resolved, a brother with allergies, a mother with allergies and type 2 diabetes mellitus and obesity and heart disease requiring a pacemaker, \"digestive issues,\" arthritis, \"significant body pain,\" hypersensitivity to odors, intolerance of alcohol, and death recently from complications from coronary artery bypass surgery, and a daughter with food sensitivities and lichen sclerosis. The patient denies any history of smoking, significant alcohol use (in fact, she flushes to red wine and gets an \"easy hangover\" upon drinking even a tiny quantity), or illegal substance use except for marijuana use in high school and college which did help her feel better.    She lists her current medications as azelaic acid topically bid, cetirizine 10 mg bid (\"definitely\" helping), generic oral cromolyn 200 mg tid, compounded oral ketotifen 2 mg tid (\"definitely\" helps, though the effect only lasts 1-2 hours), lorazepam 0.5 mg prn, amitriptyline 20 mg qd, Nature Thyroid 81 mg qd, doxepin solution 12 mg qd, gabapentin 100 mg tid, metronidazole 0.75% topical gel bid, and a course presently of albendazole 200 mg bid to treat leishmaniasis reportedly diagnosed recently in a stool sample.  She lists her current allergies as rash to adhesive tape, hypertension to epinephrine, and urticarial rash to penicillins.    Exam finds a tired, abnormally thin " "woman for her height, concerned and seldom expressing a smile but in no apparent acute distress, pleasant enough given her concern, cooperative, fully alert and oriented, independently ambulatory, presenting with her obviously appropriately concerned/supportive  who was taking notes throughout the encounter and who had an excellent understanding of her history (his memory clearly eclipsed hers at several points in the encounter).  Vitals per chart, notable for /60, pulse 75, weight 122 pounds.  Key findings are her tired, thin, unhappy, vaguely chronically ill general appearance, HEENT benign, no plethora/pallor/diaphoresis/jaundice/rash/bleeding/bruising, neck supple, no JVD or thyromegaly or carotid bruits, no palpable adenopathy or tenderness at any of the usual node-bearing sites, clear lungs, regular heart with no adventitious sounds, abdomen benign, just the very slightest trace of distal bilateral lower extremity pitting edema, neuro grossly intact except for tingling \"all over.\"  On a light scratch test on the upper back, moderately bright dermatographism (erythroderma only, no hives) quickly emerged and was fully sustained (perhaps even brighter?) when last checked 10 minutes later.     Review of available lab data was notable for a wide range of normal labs at [facility] in 6/15 including tryptase, whole blood histamine, CMP, iron studies, folate, B12, and 24-hour urinary N-methylhistamine and 11-beta-prostaglandin-F2-alpha (also negative [id number] mutation analysis in peripheral blood), plus a similar range of normal labs at [facility] in 10/15 (also a negative 24-hour urinary 5-HIAA at this time).  In 7/15 chromogranin A was normal, but transforming growth factor beta 1 was roughly triple the upper limit of normal.  In 9/15 another set of routine chemistries and the above-noted mast cell  labs were again normal.  The patient and her  note there is uncertainty as to whether the " "specimens were handled properly by the various labs except in the most recent round of testing.  Duodenal biopsy obtained at [place] in 10/15 was read as normal (only routine staining was performed).  [facility] (unclear date) showed \"urticarial hypersensitivity reaction with slight spongiosis, focally excoriated, with a perivascular and interstitial infiltrate including lymphocytes and eosinophils.\"    Labs done here yesterday included a 100% normal CMP except for mild hypoglycemia (60 mg/dl) and minimal hypermagnesemia, a CBCD notable only for mild normocytic anemia (Hgb 10.3 g/dl), and normal PT/PTT.    A/P: Kudos to the patient's acupuncturist and the patient's , because I think they're likely right in their suspicion that a mast cell activation disorder (MCAD) -- and far more likely the far more prevalent (if only recently recognized) type termed mast cell activation syndrome (MCAS) than the rare (but long recognized) type termed mastocytosis -- is at the root of most or all of the patient's chronic multisystem polymorbidity of generally inflammatory and allergic themes. Beyond all the other diseases already ruled out by the great extent of testing she's undergone to date, I don't know of any other human disease that comes anywhere near as close as MCAS does to accounting, directly or indirectly, for the full range and chronicity of all the symptoms and findings here. (Of note, too, I'm not saying that any of her prior diagnoses are wrong (other than any assertions of psychosomatism which might have been made along the way). It's just that each of them accounts for only one subset or another of all that's gone on in her, while MCAS can account for most or all of everything that's been going on in her.) All of that said, she needs objective laboratory evidence of improperly behaving mast cells (note it's possible the results from some of her earlier mast cell  determinations might be false " "negatives due to specimen handling issues), toward which end I ordered the range of testing I typically check in assessing for MCAS, namely, a CBCD, CMP, magnesium, PT/PTT, chilled serum tryptase and chromogranin A, chilled plasma prostaglandin D2 and histamine and heparin, chilled random urinary prostaglandin D2 and N-methylhistamine and leukotriene E4 and 2,3-dinor 11-beta-prostaglandin-F2-alpha, and chilled 24-hour urinary prostaglandin D2 and N-methylhistamine and leukotriene E4 and 2,3-dinor 11-beta-prostaglandin-F2-alpha. I also ordered an anti-IgE IgG and an anti-IgE receptor antibody, which won't be useful diagnostically but may influence certain therapeutic decisions down the road if MCAS is proven. We confirmed she has abstained from confounding use of proton pump inhibitors (PPIs) and non-steroidal anti-inflammatory drugs (NSAIDs) for the prior 5+ days. We provided her careful written and verbal instructions regarding the 24-hour urine collection including the importance of continuous chilling of the specimen throughout collection and transport. She understands the various reasons why a single round of  testing might yield all negative results, and she understands that if this happens, it of course doesn't even begin to invalidate/refute/negate even a single element of her history (which strongly argues for a mast cell disorder). Her history is what it is, so if we get a negative round of testing, I'll simply recommend repeat testing (which she'll of course need to pursue locally), albeit with specimen collection at that point preferably deferred to a particularly symptomatic day. I also asked her to work with our clinic nurse coordinator, Clary Regan RN, to try to arrange for her old duodenal biopsy to be reassessed (either locally or here) with  staining, as mast cells can't be seen with routine H&E staining and I've often seen \"normal\" or \"mildly chronically inflamed\" GI tract " "biopsies in MCAS patients \"light up\" on  staining revealing mast cell disease. If 2-3 rounds of blood and urine testing yields all negative results, and if restaining of any available old biopsies either can't be done for some reason or yields negative results, the \"backup plan\" will be to pursue a fresh set of bidirectional endoscopies to obtain biopsies throughout the luminal GI tract for pathologic evaluation specifically (using  staining at a minimum) for increased (>20/hpf) numbers of mast cells (as often seen mildly-moderately in MCAS, though not to anywhere near the degree (or patterns) seen in mastocytosis).     Although I cautioned her that she doesn't have a definitive diagnosis of MCAS yet, we did engage in extended discussion today about general aspects of MCAS including its essential nature of chronic multisystem polymorbidity of a generally inflammatory theme, the availability of many therapies shown helpful in various MCAD/MCAS patients, the good likelihood of (eventually) finding therapy that will help her achieve the goal of feeling significantly better than the pre-treatment baseline the majority of the time, and the present frustrating absence of any biomarkers that can help predict which of the many available, potentially helpful therapies is most likely to benefit the individual patient. She understands that if we are able to secure a diagnosis of MCAS, she will be dependent on pursuing -- in patient, persistent, and methodical fashion, trying as hard as possible to make just one change at a time -- trials of the various therapies (which, again, lacking predictive biomarkers, we generally pursue in order of escalating cost). She understands that once all test results are available about a month from now, I will write an addendum to this note (to again be distributed to all of her physicians listed below) providing my interpretation of the results and recommendations for next " "steps.     I told her that the only therapy I am willing to empirically recommend in a patient with suspected, but not yet proven, MCAS is a full (H1 + H2) histamine receptor blockade as she is presently doing.  She understands that some patients find that alternative H1 or H2 blockers serve them better, some patients find that tid dosing serves them better than bid dosing, and some patients find that slightly higher dosages (e.g., 20-30 mg rather than 10 mg of cetirizine, or 150 mg rather than 75 mg of ranitidine) serve them better than lower dosages. She will need to \"experiment,\" taking 2-4 weeks with each specific combination of drug/dose/frequency to understand what it can accomplish for her in controlling this disease of naturally waxing/waning intensity. I also cautioned her that MCAD/MCAS patients have quite a predilection to adversely react not necessarily to the active ingredients in their medications but rather to the excipients (fillers, binders, dyes, preservatives, etc.) in their medications. As such, if she experiences an adverse reaction very shortly after beginning an ordinarily well tolerated medication (as is certainly the case with the H1 and H2 blockers), excipient reactivity must be suspected and she should promptly work with her pharmacist to review the medication's ingredient list, try to identify the likely offending ingredient, and try one or more alternative formulations of the medication which don't share any of the offending formulation's excipients. She appears to understand.     She lives far too distantly to return here with any significant frequency.  She understands she will remain 100% dependent on her local providers for management of all acute and chronic issues with the disease.  I'll see her roughly annually for strategic reassessments.     As is usually the case with initial consultations for mast cell disease, this was a long encounter, 135 minutes in total, with 70 minutes " "spent in counseling. The patient and her  indicated that all of their questions at this time had been answered to their satisfaction, and they expressed appreciation for the time I had given them.      Typed, reviewed, and electronically signed by: Britton Rodarte M.D.     DT: 02/28/17 08:33 PM    cc: 1. Terrell Boss M.D. ([address] [place] [address], [phone], fax [phone])  2. Fe Cole M.D. ([facility], [address], [place] [address], [phone], fax [phone])      Addendum (3/25/17): Kudos to the patient's acupunturist and the patient's , as they have been proven right: labs are back are back and are diagnostic. As is commonly seen in this work-up which necessarily casts a fairly wide net over the range of clinically testable mediators which are relatively specific to the mast cell, most of the tests were normal (or virtually so), but the tests detailed below were positive.  (All of the prostaglandin D2 tests were below their lower limits of normal, suggesting specimen mishandling or occult salicylate use, but it's a moot point now that diagnosis has been established.)  I'll also note that somehow we did not get the quantitative immunoglobulin profile I had ordered, so if she's not had that checked locally, it still ought to be pursued locally to rule out a significant (and potentially treatable) immunoglobulin deficiency contributing to the frequent \"infections\" she has suffered (though I'll also note the possibility, now that a diagnosis of MCAS has been established, that many of these episodes could have been sterile (i.e., MCAS-driven), rather than infectious, inflammation).     Therefore, based on (1) a clinical history highly consistent with chronic/recurrent aberrant mast cell  release, (2) a mildly elevated chromogranin A (109 ng/ml, normal 0-95, and absent any of the known confounders of heart or renal failure, recent PPI " "use, or evident neuroendocrine cancer) (but a repeatedly normal serum tryptase, largely ruling out systemic mastocytosis), (3) increased (>20/hpf, per the Jesu et al., Arch Pathol Lab Med 2006 paper which in my experience is most commonly viewed by pathologists as the arbiter in this matter) mast cells (40-45/hpf) on  staining of the duodenal biopsy obtained in 10/15 at [place] and originally read (on routine (H&E) staining) as normal, (4) absence of any other evident disease better accounting for the full range and chronicity of all the symptoms and findings in the case, and (5) at least partial response to mast-cell-targeted therapy (e.g., a variety of H1 blockers, cromolyn, ketotifen, a benzodiazepine), mast cell activation syndrome (MCAS; 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 Of note, again, I don't think any of her prior diagnoses are wrong (other than any assertions of psychosomatism that might have been made along the way), but the diagnosis that seems to best underlie/unify the largest portion (potentially even all) of her large array of past and present issues is MCAS, and therefore treatment efforts directed at such would seem to be warranted.  To be sure, all of her physicians must maintain vigilance for other processes for which -- again, whether ultimately dependent on, or completely independent of, her MCAS -- standard therapies other than mast-cell-directed therapy have been defined, as such standard therapies would of course be the more appropriate choices for such processes.  However, with MCAS now having been defined in this patient per published diagnostic criteria, emergence of a new process that is potentially consistent with MCAS can be reasonably attributed to MCAS once other \"routine\" evaluation for that " "process has ruled out other, \"traditional\" causes for such a process.     I'd like to briefly address just a bit more why this is MCAS and not mastocytosis. First of all, I saw no skin lesions suggestive of cutaneous mastocytosis, and her history of symptoms consistent with aberrant mast cell  release dates back to childhood (as typically seen in MCAS, in my experience now across more than 2000 MCAS patients) rather than the de uday presentation in middle or older age usually seen with systemic mastocytosis or the classic presentations of urticaria pigmentosa typically seen in childhood. Her normal tryptase, too, is far more consistent with MCAS and makes systemic mastocytosis (SM) quite unlikely (not impossible, but quite unlikely). The rare normal-tryptase (or minimally-elevated-tryptase) SM virtually always is the indolent form of SM (ISM), and to the best of our present knowledge, there's no difference in the prognosis of, or the therapeutic approach toward, ISM vs. MCAS. Therefore, even if we're missing the uncommon normal-tryptase ISM by not pursuing more biopsies of various extracutaneous tissues, there would seem to be nothing lost by \"misdiagnosing\" her with MCAS. (There certainly are no clinical or laboratory features here to suggest a comorbid hematologic malignancy.) I'll note, too, that transformation of ISM to aggressive SM (ASM) is rare, and transformation of MCAS to any form of SM has in fact never been reported yet in the time since the first case reports of MCAS were published in '07.     As such, we can reasonably confidently exclude mastocytosis as the issue here and I don't think marrow examination is warranted, and until somebody figures out another diagnosis that even *better* accounts for all that has gone on in her, it seems reasonable to go with MCAS as the best root operating diagnosis here.     What I'd like to do at this point in the discussion is provide a range of general " information about MCAS and its therapy.     I will note that it's of course possible that the mast cell activation found in her is purely secondary (to some unknown other chronic inflammatory disease) rather than primary (mutational) -- it will require mutational analysis that won't be available in the clinical laboratory for at least another 5-10 years before such a distinction can be routinely made -- but I will assert, again, that she meets all current diagnostic criteria for MCAS and MCAS is the best unifying explanation at present for her large assortment of problems. As treatments for other (less than unifying) diagnoses have generally not yielded substantial clinical improvement to date, and since a diagnosis of MCAS has now been made per current diagnostic criteria, it would seem that treatment efforts directed at MCAS are warranted (presuming she is less than wholly satisfied with the gains she has made with her present regimen).     I feel it's important to comment on how a normal tryptase is not necessarily inconsistent with mast cell disease. Since its discovery in the 1980s, tryptase has been inexorably linked with mast cell disease and generations of physicians have been trained that it is virtually impossible to have mast cell disease unless serum tryptase is elevated -- but we now know this precept to be incorrect. Although initial studies of tryptase led to thinking its level reflected the total body mast cell activation state, in the last decade there has been a sea change in this understanding and now it is clearly understood (and even publicly acknowledged by tryptase's discoverer) that the level of tryptase far dominantly reflects the total number of mast cells in the body and far less the total body mast cell activation state. As such, one would expect to find the significantly increased tryptase level typically found in >80% of systemic mastocytosis patients, while in MCAS (where there is  "little to no increase in the numbers of mast cells) one would expect to find little to no increase in the tryptase level, and when no increase can be found in tryptase, one typically has to find evidence of mast cell activation by examining other relatively specific mast cell mediators, a tricky business given their typically very short half-lives and great thermolability.     Once diagnosis of MCAS is established, there are initial questions/issues about natural history and prognosis.     Although for many reasons a unifying diagnosis of a mast cell disorder typically isn't suspected until decades after symptom onset, the chronic multisystem polymorbidity of a generally inflammatory   allergic theme that is MCAS tends to initially emerge by adolescence or early adulthood but often happens as early as childhood or even infancy, this last a scenario in which the rare inborn autoinflammatory syndromes (AISs) need to be considered in the differential diagnosis, especially since (1) recent molecular investigations in the AIS area have been discovering that some of them actually are specific variants of MCAS and (2) highly specific (and effective) drugs exist for some of these syndromes. MCAS tends to \"step up\" its baseline symptoms at irregular intervals, generally shortly after a major (physical and/or psychological) stressor (e.g., trauma, major infection, surgery, distant travel with novel antigenic exposure, job loss or other severe financial strain, death of a loved one, etc. etc. etc.). In the absence of formal study yet of this issue, present best estimates of survival in MCAS are for a normal lifespan (akin to what's been shown in studies of indolent SM) and that a wide variety of medications have been shown effective in various MCAS patients. Although there appears to be a very low rate of transformation of indolent SM to more aggressive forms of SM, there has not yet been a single reported case (nor I have " "observed or heard of a single case) of MCAS transforming to any form of mastocytosis. At present, with no objective markers of therapeutic response having been developed yet (and which may be quite difficult to develop given the extreme heterogeneity of the clinical presentation of the disease), the goal of therapy is entirely subjective, namely, feeling significantly better than the pre-treatment baseline the majority of the time. Feeling \"perfect\" or feeling significantly better \"all the time\" is not a reasonable goal given the complexity of the disease (see [email or web address].[email or web address]/copelibrary?key=mast%20cells as one illustration of this point). Most MCAS patients are able to eventually identify significantly helpful therapy, but at present there are no published/validated biomarkers that can predict which therapies are most likely to benefit a given patient. As such, both patient and doctor must practice patience, persistence, and a methodical approach -- trying as hard as possible to make just one change in the regimen at a time -- in stepping through trials of various medications (generally proceeding in order of increasing cost given that no better scientifically informed strategy is presently available), retaining treatments which clearly provide significant benefit (which for most medications in this area can be determined within 1-2 months) and decisively stopping those which do not meet this high bar, lest unmanageable polypharmacy develop.     In my opinion, her early history was not burdened with multisystem inflammatory issues nearly as much as one typically sees in classic autoinflammatory syndromes, so I don't think it would be reasonable to pursue genetic testing for such at this time.  Of course, if she proves refractory to a number of MCAS-targeted therapies, the utility of such testing might then be increased.  Initial evaluation by a genetic counselor usually smooths the way " toward insurance coverage of the testing for these conditions (e.g., the periodic fever syndrome 7-gene sequencing panel (code PRFEVERPAN) at [facility] in [place] (http://[email or web address]) or the similar (periodic fever syndrome) 21-gene sequencing panel at [facility] ([email or web address]://[email or web address].[email or web address])).     I think her normal plasma histamine and urinary N-methylhistamine levels make it pretty unlikely there's a deficiency in diamine oxidase (KYLIE) or a poor-metabolizing mutation of histidine N-methyltransferase (HNMT) here, and thus I think testing for KYLIE deficiency and mutational analysis of HNMT is not warranted.  Nevertheless, if these tests are desired, KYLIE deficiency testing can be pursued via [facility] in [place], [place] ([email or web address]), and HNMT mutational analysis can be ordered as a single-gene analysis at various facilities (e.g., [facility] in [place], [place] ([email or web address])).     Current understanding of the genetics in MCAS is limited. Repeated preliminary datasets from the [facility] demonstrate the disease is clonal in essentially every patient, albeit vastly heterogeneously so, thereby likely explaining the vast clinical heterogeneity (and vastly heterogeneous therapeutic responsiveness) with which the disease presents. The [facility] team has also provided repeated preliminary datasets demonstrating the disease is likely epidemic (present in at least 5-10% of the general [place] population), unsurprising given increasing data suggesting various epidemic chronic idiopathic inflammatory diseases (e.g., chronic fatigue syndrome, fibromyalgia, irritable bowel syndrome) may well be merely assorted variants of MCAS. Other not-so-obviously-inflammatory (but nevertheless still likely inflammatory) diseases, too, may be assorted variants of MCAS, such as chronic idiopathic urticaria, idiopathic anaphylaxis, Lorena Danlos Syndrome Type III, postural  "orthostatic tachycardia syndrome (POTS), dysautonomia, autism, attention deficit hyperactivity disorder, etc. etc. etc. However, to be clear, none of these diseases has yet been proven to be forms of MCAS, and much research into this hypothesis is needed in each of these diseases. Furthermore, the [facility] team has clearly demonstrated the high familial predisposition of the disease in spite of the fact that the  mutations appear virtually always somatic/acquired (i.e., not germline/inherited), and relatively early in life at that. (It is known that the mutations driving aberrant MC function in any given affected patient in an affected tg are different from the mutations in other affected members of the affected tg, explaining why the different affected members of an affected tg manifest somewhat different clinical presentations. The epigenetic effect of \"anticipation\" is also seen in mast cell disease kindreds, with later generations first manifesting the disease at earlier ages and with overall more severe phenotypes in later generations.) Preliminary data are just beginning to emerge that the reconciliation of these two superficially conflicting observations (familial predisposition vs. the somatic nature of the causative mutations) stems from recognition that most MCAS (and SM) patients also bear (inheritable) epigenetic mutations, and it is currently hypothesized that these epigenetic mutations create states of genetic fragility such that assorted biochemical signal patterns which flood the body in response to assorted (physical or psychological) stressors interact with such fragility states to create the actual genetic mutations in marrow stem cells or pluripotent progenitor cells which then \"trickle down\" to mast cells (and, to be sure, other lineages, but when the end clinical effects of such mutations are dominantly operant in the mast cell as opposed to other types of cells, it is " "reasonable to term the situation a mast cell activation syndrome). These genetic mutations then create states of not only constitutive mast cell activation but also aberrant mast cell reactivity, and the end clinical effects seen in any given MCAS patient are the net results of extremely complex networks of interactions among abnormal (and normal) MCs and abnormal (and normal) other cells.     As previously noted, there is a large array of drugs shown helpful in various MCAS patients, but \"Step 1\" in treating any mast cell disorder -- and I cannot overemphasize the importance of this step -- is identification (as specifically as possible) and avoidance of triggers (be they chemical/antigenic or physical such as cold, heat, ultraviolet light, etc.). Note medication excipients (e.g., fillers, binders, dyes, preservatives) often are triggers, and rapid adverse reaction to an ordinarily well tolerated drug in an MCAS patient is less likely a sign of intolerance of the active ingredient and far more likely a sign of an excipient reaction mandating prompt return to the pharmacist (commercial or compounding) to identify alternative formulations to be tried which do not contain any of the offending formulation's excipients. Adding a drug to the allergy list is unhelpful -- indeed, frankly counterproductive -- when the true offending agent is an excipient in the particular formulations of the drug that were tried. Offending excipients should be identified as specifically as possible, added to the allergy list, screened against the rest of the patient's present medication list, and screened against all medications prescribed in the future.     \"Step 2\" in treating MCAS ordinarily is identifying an optimal full (H1 + H2) histamine receptor blockade as detailed in my initial note, except that I erroneously mentioned in that note that she was already on a full histamine blockade when actually she's only on a full H1 blockade so " far and needs to add an H2 blocker.  Most MCAS patients do much better with histamine receptor blockers (H1 + H2) given at least twice daily rather than once daily; some do even better with tid dosing rather than bid dosing. Some patients clearly do better with one particular H1 blocker compared to another, or one particular H2 blocker compared to another. Thus, experimentation with different H1 and H2 blockers is reasonable to try to identify a particular optimal regimen. With the antihistamines, it usually takes only 2-4 weeks with each particular H1 blocker (at any given dose/frequency) or H2 blocker to identify (across the natural hourly/daily/weekly waxing/waning of symptoms from inappropriate mast cell activation) the benefits and toxicities of that specific regimen, allowing a decision at that point as to which dosing (or medication) change should be tried next. These drugs ordinarily are so well tolerated that if adverse reactions rapidly emerge, excipient reactivity is far more likely than reactivity against the drug molecule itself. In the [place] non-sedating H1 blockers that are commonly tried are loratadine (starting at 10 mg bid), cetirizine (starting at 10 mg bid), fexofenadine (starting at  mg bid), or levocetirizine (starting at 5 mg bid). H2 blockers that are commonly tried are famotidine (20-40 mg bid) and ranitidine ( mg bid); in much of [place] and certain other regions, rupatadine has been demonstrated to be a useful H1 blocker in mast cell disease. Through cautious experimentation, some MCAS patients discover that higher individual dosages (e.g., loratadine 20-30 mg instead of 10 mg) or higher dosing frequencies (e.g., tid instead of bid) bring them significantly greater benefit than lower dosages or frequencies. The patient who is taking too much H1 blocker almost always discovers that dosing is excessive when the typical anticholinergic toxicities are noted: newly (or significantly  worse) dry eyes, dry sinuses, dry mouth, dry throat, urinary hesitancy, and/or constipation.     A few MCAS patients are so severely afflicted as to be in an essentially constant anaphylactoid state. Although I don't think this patient is anywhere close to the point of needing this treatment, I will note -- merely for *potential* future use in this patient, should she advance to the point of suffering severe, chronically life-threatening and/or disabling disease proving refractory to a large number of other treatments -- that I have had success in some (now more than two dozen) very severely afflicted MCAS patients with continuous infusion of (preferably preservative-free) diphenhydramine. I typically start dosing (inpatient, to permit close monitoring) at 5 mg/hr and escalate in increments of 1-2 mg/hr with each flare of symptoms. Dosing above 15 mg/hr is likely to be futile and toxic, but I have now seen (though not yet had opportunity to publish beyond abstract form) excellent responses in the large majority (~90%) of a bit more than two dozen patients in whom I've now tried this, and all the responses have occurred in the 10-14 mg/hr range. Obviously, a semipermanent IV line (typically PICC, PASport, or Portacath) needs to be placed prior to discharge if this treatment is found helpful; note that some patients react to the materials in some lines, so sometimes more than one line needs to be tried. Even once an optimal chronic maintenance dose is identified, patients may continue to have occasional flares, for which bolus dosings of 10-25 mg via the pump are usually sufficient to regain control. Of note, in one patient in whom the infusion of preservative-free IV diphenhydramine seemed to trigger flares and who could not tolerate the infusion at more than 8 mg/hr, a trial of a different 's preservative-free IV diphenhydramine instantly resolved the intolerability and resulted in good response within  the expected dosing window, thus demonstrating there had to have been an unacknowledged excipient or unrecognized contaminant in the first product tried; indeed, I then discovered at the FDA's website that the first product's  (YUNG) had been repeatedly recently cited by the FDA for contaminated product and inactive product in some of its other IV products and also had been cited for failing to respond to prior citations. (I have since gained similar experience with the allegedly preservative-free IV diphenhydramine product from [facility], too. In all of these few, severely afflicted patients who failed YUNG and [employer]-jason IV diphenhydramine, a trial of similar product from [facility] then proved successful.) In other words, although it is as theoretically possible to develop true allergy to diphenhydramine as to any other medication, ordinarily diphenhydramine is an extremely well tolerated drug, and thus even in a formulation which supposedly contains absolutely nothing but diphenhydramine and water, intolerance should raise more concern for excipient reactivity than diphenhydramine reactivity.     I will further note that I also have (unpublished, limited) clinical experience that addition of continuous famotidine infusion (2.5 mg/hr) to continuous diphenhydramine infusion (CDI) can provide clinically significant further disease control beyond what is seen in some patients with CDI together with intermittent (oral or IV) histamine H2 receptor blocker therapy.  Still, if it is ever determined that CDI is necessary in this patient, I would recommend starting it first and establishing a stable, beneficial dose before adding continuous famotidine (or ranitidine), so that the different benefits from the different infusions can be distinguished.     Once an optimal full histamine receptor blockade has been established (presuming such drugs help at all; note that the heterogeneity of the disease is such that one  can't presume *any* mast-cell-targeted medication or medication class to necessarily prove effective in all MCAS patients), the question becomes what to try next. Given the lack of validated biomarkers at present to predict optimal therapy for the individual MCAS patient (see the note in the following paragraph for a bit more discussion on this important point), I tend to start inexpensively and progress by cost as needed (though with occasional, carefully considered exceptions as noted below). Whenever possible, one intervention should be tried at a time, generally starting at a low dose and escalating step-wise in dose or frequency about every 1-2 weeks as tolerated so that a maximally tolerated dose and a maximally effective dose and frequency can be clearly identified. If the intervention is tolerated but significant benefit is not clearly identified after 4-8 weeks, the drug should be dropped and the patient should proceed to the next trial. When the medication that is significantly effective for a patient's particular variant of mast cell disease is found, the improvement is often so starkly apparent to the physician as he walks into the exam room at the next check-up in 1-2 months that sometimes words do not even need to be exchanged.     There is an understandable temptation to expect that elevated prostaglandins should nadiya for likelihood to respond to NSAIDs, or that elevated leukotrienes should nadiya for likelihood to respond to leukotriene receptor antagonists, or that elevated histamine or histamine metabolites should nadiya for likelihood to respond to histamine receptor antagonists and/or diamine oxidase, etc. etc. etc., but the reality is that at present there simply are no data demonstrating such relationships. The mast cell is capable of producing and releasing more than 200 mediators, and the few we measure obviously are a very poor surrogate for the totality of the signalling chaos in the disease,  "plus, as noted above, there are preliminary data suggesting extreme mutational heterogeneity in multiple mast cell regulatory elements in essentially every patient with the disease, all but guaranteeing extreme heterogeneity in patterns of clinical presentation and patterns of therapeutic responsiveness.  Thus, while there's certainly nothing wrong in trying, for example, a leukotriene blocker as an early intervention in an MCAS patient with elevated leukotrienes, one should not draw any inferences at all (about, for example, the accuracy of the diagnosis) if the patient fails to respond to such \"logical\" therapy. The disease is simply far too complex for expectations of response to be that simple. Similarly, I should emphasize that the anecdotal observations I offer below (e.g., patients with diffusely migratory pain, especially bone pain in the legs, tend to respond to hydroxyurea, and patients with inappropriately \"normal,\" or even mildly elevated, H/H tend to respond to imatinib) are just that -- anecdotal -- and have not yet been published or otherwise subject to peer review, let alone put through formal evaluation to establish them as \"validated biomarkers.\" All I can offer at this point is my accumulated clinical experience in treating more than 1,000 MCAD patients (the vast majority with MCAS). This certainly will be important research to be done as funding for such can be obtained, but the bottom line at present is that research has not been done, so the physician treating an MCAS patient needs to keep in mind the great limitations on what's currently *reliably* known about MCAS.     On a matter that's different from, but nevertheless somewhat related to, the excipient issue, I should note that if the patient has any known drug-metabolizing-enzyme (DME) polymorphisms (e.g., in cytochrome p450 isozymes 2C9, 2C19, 2D6, or 3A4, all of which can be genotypically tested quite readily in the [place] dosing " "adjustments will be needed as appropriate for the patient's particular polymorphisms. Like MCAS itself, pharmacogenomic polymorphisms are far more prevalent (by available epidemiologic data) than physicians typically suspect. A poor-metabolizing DME polymorphism should be suspected (and the patient should be appropriately genotyped) if, after the patient has had some time on the drug at routine doses, a toxicity develops which is typically seen only at high doses of that drug; conversely, a rapid-metabolizing DME polymorphism should be suspected (and, again, the patient should be appropriately genotyped) if an ordinarily very reliable drug effect (e.g., INR increase with warfarin) is not seen with typical dosing. It is unknown whether there is a relationship between the genetic/epigenetic origins of mast cell disease and the genetic basis of DME polymorphisms, but I certainly have seen many DME polymorphisms in MCAS patients.     Significantly beneficial drugs obviously should be retained in the regimen beyond the trial period. There are no biomarkers at present to identify when the disease has come \"adequately\" under control. It is purely the patient's subjective judgment as to whether sufficient improvement has been attained to preclude, at least for the time being, further medication trials, or not. The goal in this very complex disease is to identify a regimen that helps the patient feel significantly better than the pre-treatment baseline the majority of the time, and with sufficient patience, persistence, and a methodical approach (trying as hard as possible to make just one change in the regimen at a time) exercised by both patient and local treating physician, in my experience most MCAS patients have been able to attain the goal.     Other inexpensive agents to be considered include potentially sedating H1 blockers (e.g., hydroxyzine, doxepin, cyproheptadine, clemastine), NSAIDs (note caveats below), " quercetin or other flavonoids, alpha lipoic acid, N-acetylcysteine, vitamin C, vitamin D, benzodiazepines, and even amphetamines and low-dose naltrexone; KYLIE supplements, too, occasionally provide some help.  More expensive agents include leukotriene inhibitors, ketotifen (this is more expensive only in the [place] it usually is very inexpensive in every other country on the planet), cromolyn, pentosan, ivabradine, dronabinol, and hydroxyurea. Like ketotifen, there is another mast-cell-stabilizing/anti-inflammatory agent, too -- tranilast -- readily available in the [place] but only available in the [place] via compounding. Substantially more expensive agents include omalizumab and tyrosine kinase inhibitors such as imatinib. I also have seen somatostatin occasionally prove helpful for refractory non-infectious diarrhea in MCAS.  Although there is not yet any reported use of alpha interferon in MCAS, the systemic mastocytosis literature shows that this drug can help reduce, in some patients, not only tumor load but also mast cell activation symptoms, therefore arguing for potential utility of the drug in MCAS (more comments below). Immunosuppressants such as hydroxychloroquine, azathioprine, cyclosporine, rapamycin, sirolimus, tacrolimus, mycophenolate, cyclophosphamide, etc. tend to help little but occasionally show benefit and thus are not entirely unreasonable to try; dosing is individualized, but it is reasonable to start as is typically done in other situations in which these drugs are used. There are theoretical reasons why newer targeted anti-inflammatories (e.g., infliximab, etanercept, adalimumab, etc.) and Janus kinase (CHRISTINE) inhibitors might be helpful in at least some cases of MCAS, but there have not yet been any reports of ad hoc use of such drugs in MCAS, let alone formal studies. Of note, too, are the (expensive) NK-1 receptor blockers (e.g., aprepitant), which are approved for refractory post-operative  "or chemotherapy-induced nausea and vomiting but have not yet been case-reported or formally investigated (let alone FDA-approved) in \"mast cell disease,\" yet they have been shown helpful in \"refractory pruritus\" of both malignant and benign/idiopathic etiologies, and one could be forgiven, in my opinion, for suspecting \"refractory pruritus\" is likely a manifestation of mast cell activation; it's interesting that binding of substance P ligand to the NK-1 receptor (which is known to be expressed on the mast cell surface) is known to cause explosive mast cell degranulation.  Again, there simply is no way to predict which medications are most likely to help which MCAS patients, and it is the treating physician's best judgment as to the specific sequence of medication trials that will best suit the individual patient. There are no standards established in this matter, and there is no \"right\" or \"wrong\" to trying one medication sooner rather than later.     I typically start a trial of doxepin in an MCAS patient at 6.25-10 mg bid, escalating weekly as tolerated (sedation is usually the limiting toxicity) in 6.25-10 mg bid steps to maximal efficacy or a maximum dose of about 40-50 mg bid. Hydroxyzine can be tried starting at 10-25 mg bid and escalating every 1-2 weeks in steps to  mg bid-tid. Cyproheptadine can be tried starting at 4 mg bid-tid and escalating every 1-2 weeks in steps; maximum dosing typically is 8 mg qid.  Clemastine (which is usually accessible over-the-counter) can be tried starting at 1.34 mg bid, escalating weekly as tolerated in steps of 1.34 mg bid to maximal efficacy or a maximum dose of 2.68 mg bid-tid.     Stimulants such as Adderall (amphetamine-dextroamphetamine, or analogs such as lisdexamfetamine) or Ritalin (methylphenidate) are occasionally helpful. Adderall can be started at 5 mg once or twice daily and escalated in steps to as high as a total daily dose of 60 mg. It probably should " not be used in patients with a history of substance abuse. Ephedrine starting at 12.5 mg bid may be helpful; it can be stepped up every week or so as tolerated to as high as 150 mg daily in divided doses. Methylphenidate can be tried at 5 mg bid and escalated in steps every 1-2 weeks as tolerated to maximal efficacy or a maximum dose of 20 mg bid-tid (optimally 30-60 minutes before meals); if the drug proves helpful, long-acting (and thus potentially more convenient) formulations are available.     Narcotics often are triggers in MCAS. I try to avoid prescribing narcotics for MCAS patients as much as possible. In my experience and as reported in some literature, tramadol, fentanyl, and hydromorphone tend to be better tolerated than other narcotics. I have very limited anecdotal experience, too, that buprenorphine (e.g., the Butrans patch) can be well tolerated and helpful.     NSAIDs can be helpful in some mast cell disease patients but serve as triggers in others. If there is any history of NSAID intolerance, then consideration must be given to having an allergist take the patient through an NSAID desensitization protocol prior to starting a trial of NSAIDs. The NSAID most commonly used in NSAID-tolerant and -responsive MCAS patients is simple aspirin, typically beginning cautiously at 40-80 mg bid and doubling, as tolerated, approximately every 4-14 days to maximal efficacy. I would not push this past 500-650 mg bid, as I have reliably seen intolerable toxicities at total daily doses in excess of 1300 mg/d. Maintenance of 325-650 mg bid dosing requires standing GI prophylaxis in the form of either H2 blocking and/or PPI therapy. I should note, too, that I have seen MCAS patients in whom all standard COX1/COX2-blocking NSAIDs are intolerable but who then tolerate COX2-selective celecoxib (typically 100-300 mg bid) quite well and to good effect. That said, celecoxib has a sulfa moiety and traditional teaching had  long been that this drug should be avoided in sulfa-allergic patients, though more recent published experience (e.g., http://www.nejm.org/doi/full/10.1056/QFOEap612244) suggests this is an insignificant consideration and it's OK to try celecoxib in sulfa-allergic patients.     Quercetin, a flavonoid, can be obtained over-the-counter and is typically started at 250-500 mg bid and escalated in dose or frequency every 1-2 weeks as tolerated in steps of 250-500 mg bid to maximal efficacy or a maximum dose of about 1000 mg tid. If there are hints of a response, a more water-soluble (and thus better absorbed and sometimes more effective) form of this drug, quercetin chalcone, can be tried (typically at about half the dose of quercetin).     Alpha lipoic acid is typically started at 100-300 mg bid and escalated every 1-2 weeks as tolerated to maximal efficacy or a maximum dose of about 300-600 mg bid. In my experience this has tended to benefit sensory neuropathy more than other symptoms, but I also have seen an occasional case in which it provided alvarez global improvement.     N-acetylcysteine is typically started at 300-600 mg bid and escalated every 1-2 weeks as tolerated to maximal efficacy or a maximum dose of about 600-1200 mg bid. In my experience this benefits few patients (most commonly those with presyncopal issues), but sometimes its benefits are global and alvarez.     Vitamin C has been repeatedly shown to inhibit mast cell production and release of histamine. It's typically dosed at 750-1000 mg in a once-daily slow/extended-release form, though I've had one patient respond well at just 500 mg once daily, and some patients report use of such a product bid helps more and appears sustainably tolerable and effective.     Although vitamin D has not yet been reported to have helped any form of human mast cell disease, I will note that a few of my MCAD/MCAS patients who have been prescribed vitamin D supplements by  "their other physicians to address osteopenia/osteoporosis have reported improvement (soon after beginning the supplement) in symptoms which almost certainly are driven by their MCAS, and a potential direct mechanism for such a response is clear since (normal and malignant) mast cells are known to bear cell-surface vitamin D receptors and since engagement of that receptor by vitamin D (calcitriol) clearly results in activation of various intracellular pathways that result in decreased inflammation as shown by a number of measures. As such, and again with the understanding that there are no formal studies of such, vitamin D supplementation in moderation (~1,000 IU per day) would seem to be a not unreasonable, and almost certainly non-toxic (excipient issues notwithstanding) and inexpensive, intervention to try in MCAS, especially in those in whom vitamin D deficiency is identified. Of note, patients who are severely deficient in vitamin D at baseline probably initially need more aggressive supplementation (e.g., ~50,000 IU weekly for 6-8 weeks) before pursuing the above-noted daily supplementation.     I have heard of mast cell disease patients who have improved with low-dose naltrexone but have seen significant improvement in only one patient thus far. Dosing typically is in the range of 1.5-6 mg/d, though some patients may find split dosing bid more helpful.     KYLIE supplements are occasionally helpful and are typically dosed in terms of histamine-degrading units (HDUs), e.g., the HistDAO product is dosed as 20,000-40,000 HDUs (1-2 capsules), preferably 15 minutes before meals, especially meals with known higher histamine content.     Speaking of meals, it should be noted that some patients find \"low-histamine diets\" (the specific nature of which seems to vary substantially from one author to the next) helpful, other patients find other diets helpful, and most patients find diets of any sort generally unhelpful.  In " "general, dietary interventions should be viewed as exercises attending to \"Step 1\" above, i.e., identify triggers as precisely as possible, and then avoid them.  In other words, if a high-histamine-content food such as cheese or wine is found to reliably trigger a flare of symptoms, then that food should be avoided.  All one can do with regard to diet is avoid extreme diets and consider each dietary intervention as, well, an intervention akin to a medication intervention: if it has not clearly provided significant benefit after about a month, it should be abandoned and the patient should move on to another intervention/trial.     Also with regard to dietary challenges, more severely afflicted MCAD/MCAS patients sometimes have so much difficulty tolerating a very wide range of foods that \"safe\" oral intake is reduced to an extremely limited dietary range, and sometimes it unfortunately becomes the case that simply no oral dietary intake at all is tolerated. In such patients, it is important to remember that not every dysfunctional mast cell in the body of an MCAS patient is dysfunctional in the same way as every other dysfunctional mast cell in that patient's body, and it often is the case that mast cells in different sites in the patient's body -- even in different segments of a given organ/system -- will be dysfunctional in different ways or degrees. As such, even though the mast cells in the proximal GI tract (mouth, throat, esophagus, perhaps even stomach) may be so widely reactive as to preclude tolerable ingestion of any dietary material, it remains possible that the mast cells in the small and large bowel may still tolerate dietary material and permit absorption of nutrients. In other words, in some MCAS patients who simply can no longer eat, a PEG, PEJ, or PEG-J tube may permit continued enteral feeding, a far better approach to nutrition than parenteral nutrition (which indeed I have seen become necessary in " "a very few MCAS patients). Although I have seen occasional patients unfortunately react to such tubes themselves (requiring replacement with tubes constructed of alternative materials/plastics), in my experience most MCAS patients who come to require tube feeding adequately tolerate the tube (and the placement procedure), and then the question becomes which formula to use. I have seen a wide range of formulas prove successful -- and unsuccessful -- in MCAS patients, and only a patient, trial-and-error process will prove successful in the end (though, again, a very occasional patient proves intolerant of all available/accessible formulas and thus comes to need parenteral nutrition, with the expected much higher complication rate). In my experience, Neocate Jr. and Elecare Jr. have been the most consistently (but, again, not universally) tolerable formulas.     Benzodiazepines -- typically at low doses but given regularly because of the short half-lives of most of the drugs in this class -- also can be helpful, likely because they address the inhibitory mast-cell-surface benzodiazepine receptors. (Of course, they also address similar neuronal receptors, and given the physical proximity of mast cells and neurons in many tissues and the extensive \"cross-talk\" between these two types of cells, it should not be surprising that \"calming the nerves\" can bring about a useful downregulating effect on mast cells independent of whatever direct downregulating effect such a drug might have on mast cells via the mast-cell-surface benzodiazepine receptor.) Lorazepam is a reasonable choice, and even though clonazepam clearly binds to the mast-cell-surface benzodiazepine receptor less well than lorazepam, some patients respond well to clonazepam, too, but these drugs have short half-lives, so any benefit she gains from them will wear off fairly shortly, thus justifying regular dosing if it is going to provide her as much help as " possible. (It is for this pharmacokinetic reason that some MCAS patients who benefit from lorazepam or clonazepam clearly do better at tid dosing than bid dosing.) I typically start lorazepam or clonazepam dosing at 0.25 mg bid, escalating every 1-2 weeks as tolerated in 0.25 mg bid increments to maximal efficacy or a maximum dose of 1-1.5 mg bid-tid. An occasional patient will even do remarkably well at just 0.125 mg bid, so there's nothing wrong with starting at even that cautious a dose. Sometimes diazepam, because of its longer half-life, comes to be identified as the preferred agent of this class in the individual patient. Midazolam is usually an excellent choice for perioperative management.     Steroids of course are inexpensive and often helpful in settling acute flares of mast cell disease, but their long-term toxicities make them unattractive as chronic treatment. Of note, though it's virtually impossible for a steroid to be allergenic (just as with H1 and H2 blockers as discussed above), non-IV steroid injections (as given in painful joints, for example) often contain excipients and/or -dana anesthetics which indeed can be provocative to MCAS patients. The full ingredient list of any steroid injection should be carefully reviewed in advance -- and then re-reviewed if a reaction develops.     The mast cell's  repertoire includes a number of leukotriene moieties, and just as one can't predict which benefits might be seen with other medications tried in this disease, one shouldn't assume that there can't be any benefits beyond the respiratory tract from this traditionally asthma-targeted drug. The leukotriene receptor blocker that's usually tried first is montelukast. In my experience, MCAS patients who respond to montelukast usually respond better to twice-daily dosing (10 mg bid) than once-daily dosing; occasional patients respond even better at 20 mg bid.  I have not seen more benefit with dosing  of the leukotriene receptor blockers more frequently than bid. (Some patients clearly do better with brand-name Singulair vs. generic montelukast, or vice versa, likely due more to excipient issues than anything else.) Leukotriene synthesis inhibition can be tried, too, with zileuton, which has a short half-life such that 600 mg qid dosing generally is recommended over 1200 mg bid dosing. However, an extended-release formulation of zileuton is now available; dosing is 1200 mg bid. Liver function tests should be checked at baseline and then monitored monthly in the first quarter and then quarterly when starting zileuton.     Ketotifen is available very cheaply in every country on the planet except the [place] where it is available in oral form only via compounding, which of course incurs substantial cost. It is commonly started at 1 mg bid, escalating every 1-2 weeks as tolerated in steps of 1 mg bid to maximal efficacy or a maximum dose of 4-6 mg bid-tid. Ketotifen eyedrops can be helpful for the eye irritation frequently seen in MCAS, and this is the sole form of ketotifen that is available in the [place] through standard commercial pharmacies. Demonstration of efficacy of ketotifen in one form is often a sign that the other form will be effective, too. In my experience, different compounding pharmacies have quoted vastly different prices for compounding oral ketotifen; patients are advised to obtain multiple quotes. Although I do have some patients who have chosen to import inexpensive commercial oral ketotifen formulations from foreign pharmacies, given the many concerning reports of poor quality of some medication products obtained through some foreign pharmacies, I do not recommend my [place] patients obtain through a foreign pharmacy any pharmaceutical which can be legally (even if more expensively) obtained through a [place] pharmacy. Especially given the legal protections afforded [place] patients who obtain medications  through [place] pharmacies -- protections which may be partly compromised or even non-existent in dealing with foreign pharmacies -- foreign-sourcing of pharmaceuticals is a risk calculation to be made entirely by the patient.     As mentioned above, tranilast is readily available in the [place] but only available in the [place] via compounding. Dosing typically is 200 mg tid.     Especially in view of how the same serotonin reuptake elements present on neurons are also present on the surface of the mast cells, selective serotonin reuptake inhibitors (SSRIs) can be helpful in some MCAS patients both through neuronal binding and mast cell binding. Dosing of SSRIs in MCAS is similar to dosing of these drugs for their approved indications. Of note, physicians who prescribe SSRIs in MCAS patients must be alert to development of, and know how to manage, serotonin syndrome, whose presentation can easily be confused for a flare of mast cell activation.     Cromolyn is not absorbed to any significant extent (there is controversy as to whether this holds true in the pulmonary tree) and thus does not circulate systemically to any significant extent but still can be helpful in its OTC nasal spray (Nasalcrom) and eyedrop (Opticrom) formulations as well as when inhaled or swallowed. (A cream for topical cutaneous use can be compounded at home, too, using a bottle of Nasalcrom and various skin creams such as Eucerin; recipes can readily be found on-line.) The oral form is typically started as 100 mg (one ampule) twice daily (preferably, but not necessarily, 30 minutes before meals) and escalated every 1-2 weeks in dose or frequency as tolerated to maximal efficacy or a maximum dose of 200 mg qid, though a few patients have told me that 300-400 mg qid has provided them optimal benefit from the drug and that it's unhelpful for them at lower doses. The nebulized form is typically started at 20 mg bid and escalated every 1-2 weeks as  "tolerated to maximal efficacy or a maximum dose of 20 mg qid. In a minority of patients, initiation of cromolyn causes an initial mild-moderate flaring of disease that usually can be managed with simple extra dosings of \"rescue medications\" (e.g., H1/H2 blockers, possibly also benzodiazepines and/or steroids). Such flares typically settle after 3-7 days, but if flares are severe or appear to be coursing chronically rather than settling, then the drug should be stopped unless it is the generic formulation that was initially dispensed, in which case a trial of brand-name Gastrocrom should also be pursued, as I have seen a number of MCAS patients who find the generic formulation intolerable but who then tolerate, and respond well, to Gastrocrom (in spite of both products bearing identical ingredient lists of just cromolyn and sterile water), obviously implicating the presence in the generic product of an excipient of some sort (whether known to the  or not) which is an offending/triggering excipient in at least some MCAS patients. Importantly, cromolyn is not absorbed, so local and distant benefits seen from successful inhibition of mast cell activation at one site by one form of cromolyn (e.g., oral) may be different from local and distant benefits seen from additional application of cromolyn at a different site (e.g., the sinonasal tract, with nasal cromolyn spray).     Pentosan is typically used to help settle mast cell activation in the  tract (e.g., when the sterile inflammatory symptoms of \"interstitial cystitis\" -- frequency, urgency, dysuria, etc. -- are present). Dosing is 100 mg bid-tid and liver function tests must be monitored (typically monthly in the first quarter and quarterly thereafter).     Long available in [place] until finally gaining approval in the [place] in 6/15, ivabradine is officially approved for heart failure but can be helpful in MCAS, principally for patients particularly " "bothered by tachycardia. The approved initial dosing of 5 mg bid often is excessive for MCAS patients, and starting instead at just 1.25 mg once daily is probably more appropriate, escalating every 1-2 weeks as tolerated (bradycardia often is the limiting symptom) to maximal efficacy or a maximum dose of 7.5 mg bid.     Much as how benzodiazepines can downregulate neurons and mast cells via inhibitory cell-surface benzodiazepine receptors, dronabinol can downregulate neurons and mast cells via inhibitory cell-surface cannabinoid receptors. Dronabinol dosing typically begins at 2.5 mg bid and escalates every 1-2 weeks as tolerated in steps of 2.5 mg bid to maximal efficacy or a maximum dose of around 5-7.5 mg bid-tid. The key compound desired in the cannabinoids, of course, is cannabidiol, not the \"high\"-producing THC. I have heard from occasional MCAS patients who report cannabidiol oil (now legally accessible in certain localities) to be helpful. It is difficult to recommend smoking of cannabis due to the many other toxic compounds in smoke, which in general is a mast cell activator.     Hydroxyurea has been recognized for decades as capable of settling mast cell activation in some patients. The dosing needed to achieve such effect is typically low, starting at 500 mg once daily and escalating after 2-4 weeks as tolerated to maximal efficacy or 1000 mg once daily (though some patients report better effect at 500 mg bid). Patients who react acutely and severely to the \"Hydrea\" 500 mg formulation likely are reacting to excipients, and I have found most such patients (once recovered after having stopped the medicine) subsequently tolerate and respond well to an alternative 200 mg \"Droxia\" formulation, with optimal dosing usually working out to be in the 600-1000 mg total daily dose range. In my experience, hydroxyurea has been particularly useful for treating the diffusely migratory soft-tissue/bone aching/pain " "commonly seen in MCAS.     Perhaps due directly to the IgG receptors on the surface of the mast cell, and/or perhaps due to other, less direct mechanisms, IV immune globulin (IVIG) helps some MCAS patients (who often first come to IVIG therapy via diagnosis (prior to diagnosis of MCAS, of course) with \"combined variable immunodeficiency\" or other, typically poorly defined immunodeficiency syndromes), principally (per my unpublished observations) in reducing fatigue for 1-2 weeks, in accordance with the half-life of human antibodies. Due to the drug's modest and brief benefits and great expense, I am not much of a fan of using IVIG for treating MCAS, and I suspect most patients being treated as such likely can find more medically and financially effective therapy. Still, when most or all other options have been exhausted, it is not an entirely unreasonable option from a biological plausability perspective, though I should note I'm unaware of any published literature (even case reports) actually supporting this particular use of IVIG.     As noted in many case reports now in the peer-reviewed literature, the anti-IgE monoclonal antibody omalizumab appears to benefit some patients with MCAS, utterly independent of the pre-treatment IgE level. Dosing typically begins at 150 mg subcutaneously once every four weeks and escalates in steps as high as 300 mg subcutaneously every two weeks. In counseling patients about the risks of the drug, they should be advised of the 0.1% risk of fatal anaphylaxis mentioned in the product insert, and it is important to follow the 's recommendations to observe the patient in the infusion suite for 2-3 hours after each of the first three injections and then at least 30 minutes after each subsequent injection. In my experience, omalizumab tends to be more helpful for MCAS patients with prolific allergies, but its great cost (list price approximately US$30,000/year) needs to be " "considered when deciding whether to try it ahead of less expensive therapies. Before starting omalizumab, it may be worthwhile checking an anti-IgE IgG level, as an elevated level may indicate the presence of a true autoimmune-mediated mast cell activation for which rituximab (see below) might also be worth trying. Again, if this is going to be checked, it needs to be checked before starting omalizumab since the drug will confound the test and the patient will have to be off the drug at least a year before a reliable native anti-IgE IgG level can be determined again.     Similar cost-benefit calculation is required regarding the tyrosine kinase inhibitors. The prototypical tyrosine kinase inhibitor imatinib (~[place]$100,000/year in the [place] for name-brand \"Gleevec\" from [employer], or ~US$60,000/year for the newly available generic imatinib from [address]) is believed to target and block/inhibit some of the constitutively activating mutations in KIT suggested by some of the published research to be present in virtually every MCAS patient. The [id number] mutation found so frequently in mastocytosis is resistant to imatinib, but this mutation is virtually never found in MCAS. Imatinib-sensitive mast cell disease tends to be sensitive to low doses of the drug. Imatinib is typically managed by a hematologist/oncologist. A starting dose of 100 mg once daily is appropriate, escalating after one week (if tolerating it OK but unimproved) to 200 mg which typically is given once daily, though I have seen occasional patients report substantially better effect at 100 mg bid or 50 mg tid-qid dosing. In my experience now in seeing this drug control MCAS to a significant degree in several dozen patients, the \"sweet spot\" for dosing tends to be a 200 mg total daily dose (most do fine with once daily dosing), but I do have a few patients who have clearly identified that higher doses (300-600 mg total daily dose) definitely serve " "them better. In my experience, imatinib has tended to provide substantive benefit in MCAS patients manifesting relative or absolute erythrocytosis, perhaps as a consequence of constitutive KIT activation in turn driving JAK2 activation. (Of course, activated KIT will drive activation of the other, inflammation-driving JAKs, too, suggesting a potential role for the typically promiscuous CHRISTINE inhibitors in controlling at least some of the symptoms in mast cell disease. Indeed, I have already seen (but not yet published) excellent responses in mast-cell-driven symptoms from ruxolitinib in myelofibrosis and polycythemia vera patients and from tofacitinib in rheumatoid arthritis patients.) Absolute erythrocytosis, of course, is easy to recognize (hemoglobin, hematocrit, and/or red cell count above the upper limit of normal), but a relative erythrocytosis is more difficult to recognize, manifesting as a \"normal\" H/H in patients with marked multisystem inflammation which one would expect to cause a secondary anemia of chronic inflammation. A chronically inflamed MCAS patient's \"normal\" H/H may be evidence of the abnormal \"pressure\" being exerted on marrow to overproduce red cells, and this finding, set against a history and exam strongly suggestive of significant chronic multisystem inflammation, hints that a trial of imatinib might be appropriate sooner than its great cost might otherwise warrant. In patients with trying economic circumstances, sometimes the 's patient assistance program needs to be engaged in order for the patient to be able to access imatinib. Additional manufacturers are expected to make generic imatinib formulations available beginning in August 2016 when [employer] loses its exclusive license for marketing generic imatinib, and the price is expected to fall dramatically further at that point. Patients experiencing difficulty tolerating [employer]- and [employer]-branded imatinib may find " future formulations of imatinib, using different excipients, more tolerable. It remains to be seen whether imatinib will be made available by any  for compounding.     As inferred above, this patient's chronic multisystem inflammation would be expected to cause at least a mild anemia of chronic inflammation, and that's what's being seen in her, so this would argue (again, based solely on my experience and not any study) against a trial of imatinib any earlier in the sequence of therapeutic efforts than its extraordinary cost ordinarily would warrant. (One wonders whether the polycythemia sometimes seen in MCAS comes dominantly from constitutive activation of JAK2 caused by (mutated), constitutively activated KIT (various mutant forms of which imatinib and the other tyrosine kinase inhibitors can address).)     I also have seen low-dose imatinib (again, most commonly 200 mg/d) quickly bring complete resolution to the lipodystrophy (along with great improvement in many other symptoms) in a number of patients who were diagnosed with Dercum's disease before later being discovered to have MCAS as their unifying diagnosis. I also have seen low-dose imatinib quickly bring complete resolution to very severe, otherwise refractory hypertriglyceridemia, similarly suggesting there is some pathway by which MCAS can generate such a dyslipidemia.     Some patients who do not respond to imatinib go on to respond well to other TKIs such as dasatinib or nilotinib. As with imatinib, usually only low doses (e.g., 20-40 mg of dasatinib daily) are needed in such patients. Although there are a very few cases reported in the literature of nilotinib helping to control mast cell disease, I myself have seen no successes with this drug in the few patients in whom I have tried it; in fact, I have seen acute intolerance in about half the patients in whom I have tried it, again suggesting an issue with reactivity to the medication  product's excipients rather than nilotinib itself. I also am aware of a few cases (one recently published in the  Journal of Haematology) in which low-dose (12.5 mg/d) sunitinib was given for very severe MCAS and rapidly achieved complete response (1 case) or very good partial response (a few additional cases) without any apparent toxicity (now sustained about two years in the published patient with a complete response). (There also is a published pre-clinical report in a rat model providing rationale for trying sunitinib in mast cell disease.)     In general, I have observed low-dose dasatinib to be more effective for CNS-related symptoms (e.g., profound, ultra-refractory depression in one patient) and low-dose sunitinib to be more effective for patients whose MCAS phenotype features a strong allergic/anaphylactoid component. Although dasatinib has a propensity at CML-level dosing (~100 mg/d) to drive effusions, I have never seen such problems develop at the 20-40 mg/d dosing level. Still, in a patient with significant pre-existing pulmonary disease, the potential for pulmonary complications would have to be watched especially carefully if dasatinib were to be started.     I have occasionally seen somatostatin prove helpful for refractory non-infectious diarrhea in MCAS.  Usually the long-acting form of the drug (Sandostatin LAR) is used, with dosing typically in the 10-30 mg range (taken subcutaneously every four weeks).     I have heard from an occasional colleague (though not seen reported yet) that ursodiol (standard dosing 300 mg bid) can be helpful for GI symptoms in an occasional MCAS patient, though to date I myself have seen such improvement in only a single patient.     As inferred by its very name, MCAS is a disease principally of inappropriate mast cell activation, not inappropriate mast cell proliferation. As such, it would seem there is little call in the management of MCAS for the  "antiproliferative treatments used to treat mastocytosis such as cladribine and interferon. Both drugs have substantial toxicities which also would question their utility in MCAS, principally cytopenias and immunosuppression with cladribine and a flu-like syndrome with interferon which in my experience often don't tachyphylax as significantly or briskly as the manufacturers typically promote. Alpha interferon also bears risks for significant cytopenias, hypothyroidism, and depression. Nevertheless, downregulation of mast cell activation has been seen with alpha interferon.  Thus, one wonders whether a cautious trial of the drug might be worthwhile at some point in otherwise refractory MCAS patients, particularly those whose medication excipient intolerance issues preclude trials of most oral medications. I am unaware of any peer-reviewed literature reporting the use of cytotoxic chemotherapy such as cladribine, or any type of interferon, in the treatment of MCAS. There are preclinical data suggesting CD30-targeting brentuximab may be helpful for cytoreduction and  release downregulation in mastocytosis, but there has been no clinical testing yet in that disease, let alone in MCAS.     Urgent/emergent management of flares of mast cell disease generally include extra dosings (IV if possible) of H1 blockers (e.g., diphenhydramine  mg) and H2 blockers (e.g., famotidine 20-60 mg), possibly also with glucocorticoids (e.g., methylprednisolone 1-2 mg/kg) and possibly also with benzodiazepines (e.g., lorazepam 1-3 mg). Patients are advised to try different formulations of rapid-acting antihistamine products (e.g., liquid formulations, or rapid-dissolving formulations such as Claritin Reditab or Zyrtec Fast-Melt) to identify what is tolerable and works well for them and then to regularly carry such products with them if they frequently suffer flares. Of course, epinephrine is always the \"go-to\" drug for " "anaphylaxis unless the patient is on a beta-blocker, in which glucagon is the \"work-around\" drug. If a mast cell patient has suffered repeated sravani anaphylaxis or severely anaphylactoid states, particularly if the triggers are unclear, it is recommended that the patient always have immediate access to two doses of epinephrine (or glucagon, as just noted), usually via an Epi-Pen or equivalent device. Although The Mastocytosis Society and some experts recommend that *all* patients with (any form of) mast cell disease always carry epinephrine autoinjectors, it is unclear to me -- especially as we are increasingly learning how large and diverse the MCAS population truly is -- whether such a recommendation should indeed be made so globally. Though anaphylaxis obviously can be quickly fatal, and though anaphylaxis is always a risk at some level in (diagnosed and undiagnosed) mast cell disease patients, past behavior of MCAS largely predicts future behavior (at least until a new major stressor occurs), so in my opinion it is acceptable for patients who have never anaphylaxed (or perhaps anaphylaxed only a long time ago to a specific agent which they have since avoided and are likely to continue to be successful in avoiding) to weigh their small (but non-zero) risk for anaphylaxis against the costs, in all the meanings of that word, of carrying epinephrine autoinjectors on their persons for the rest of their lives. Some patients feel Epi-Pen Twinjectors or other autoinjector devices are more convenient than standard traditional Epi-Pens; efforts should be made to accommodate the preferences of the patient who will need to carry the device(s) with him/her for the rest of his/her life. The indications for usage of an epinephrine autoinjector is when the patient can't breathe or can't swallow, and it's important that patients be instructed in the proper use of such a device (in general: call for help; lie down flat; inject in " "one lateral thigh (through clothing is OK) (unless the instructions with her specific device direct an alternative injection approach), then inject again in the contralateral thigh if unimproved after five minutes and help hasn't yet arrived). For obvious reasons, it's very important for patients to read the instructions for their epinephrine autoinjectors as soon as they receive them, and preferably for them to even have a chance to simulate use with a dummy device.     Another drug primarily used for emergent management of MCAS -- particularly in patients with refractory angioedema -- is icatibant (30 mg, injected subcutaneously in the abdomen). The drug can be extremely effective very quickly, but its extreme expense (approximately US$7,000 per dose) perhaps calls for at least a brief trial of routine emergent management (e.g., antihistamines, steroids, benzodiazepines) before resorting to this option.  One must also be cognizant of the fact that icatibant is in the class of peptidergic drugs, and data have begun emerging in the literature suggesting that this class of agents may pose as triggers in some MCAD/MCAS patients.     I'll also note that there have now been a very few case reports in the literature of rituximab working very well to control (at least for several months) otherwise refractory \"idiopathic anaphylaxis\" (IA), which in my opinion almost certainly is a manifestation of MCAS. There also are small studies showing efficacy of immunosuppressive approaches such as with rituximab or cyclophosphamide or cyclosporine in treatment of \"chronic (auto)immune urticaria\" (associated with anti-IgE and/or anti-IgE-receptor autoantibodies).  It is likely that IA and CIU are manifestations of MCAD/MCAS, and thus, whether one applies one of these diagnostic labels or another to such patients, I feel rituximab (or other immunosuppressive approaches as briefly suggested above) are reasonable, but patients and their " "treating physicians need to be cognizant that rituximab is not curative and is expensive and that it takes roughly one year to reconstitute B-cell immunity after rituximab treatment. I'll also note that it's the treatment successes, not failures, that tend to get reported in the case literature, and I have heard from some physicians that their trials of rituximab for a few of their IA/MCAS patients have shown no benefit at all.  I, too, have seen more failures of immunosuppressant treatment of MCAD/MCAS than successes, but the occasional success of course is gratifying when many other treatments have failed.     Perioperative management is similar to emergent management. The surgeon and anesthesiologist should be aware of the patient's mast cell disease and should read any of the many treatises in the literature on the subject (a link to one such article is provided below). Perioperative guidance for professionals is also available on the website of The Mastocytosis Society at http://www.tmsforacure.org.     A medical alert bracelet is often helpful to emergency personnel. As most such personnel are not presently trained regarding mast cell disease, the first word on the bracelet should be \"anaphylaxis.\"     Although it's likely that most or all of an MCAS patient's various problems are due directly or indirectly to the MCAS, I'll also note the imperative that MCAS patients not assume that major exacerbations of prior problems, or emergence of new problems, are necessarily due to their mast cell disease. Serious illness should always first undergo standard evaluation as appropriate for the particulars of that illness, and only if no other etiology is identified, and if the problem is one that can reasonably be attributed to mast cell disease, is it then appropriate to attribute the problem, by default, to the mast cell disease. Even if an identified problem is indeed attributable (directly or indirectly) to mast " "cell disease, any standard/classic therapy that exists for the problem should be applied (concurrently with MCAS-directed therapy). For example, mast cell disease can cause myocardial infarction via multiple mechanisms, but standard myocardial infarction therapy is certainly the priority over mast-cell-directed therapy for any myocardial infarction being driven by mast cell disease. The tendency of an MCAS patient to blame all ills directly on the mast cell disease, and the desire in such patients to focus solely on MCAS-directed therapy, may be understandable, and MCAS patients typically have so often been disserved in urgent and emergency care facilities that their desire to avoid them may also be understandable, but staying home and trying to initially treat acute severe chest pain with Benadryl is obviously a Very Bad Idea that could easily and quickly prove fatal.     It's so important that I will say it again: there is no method at present to provide better guidance as to which medications should be tried sooner vs. later, there is no \"right\" or \"wrong\" decision as to which intervention to try next, and there simply is no substitute for patience, persistence, and a methodical approach -- on the parts of both the patient and the physician -- in the journey toward identifying optimal therapy for the individual patient. Some patients are so turner as to identify substantially helpful therapy within the first few months of beginning the journey; other patients require years, and trials of more than a dozen medications, before a truly beneficial one is found. Enrollment in clinical trials should be considered when such trials become available.     Mast cell disease often drives bone changes (far more often osteopenia or osteoporosis than osteosclerosis, but sometimes both osteopenia/osteoporosis and osteosclerosis at different sites in the same patient at the same time). It is reasonable to check baseline bone " "densitometry upon diagnosis of a mast cell disorder if not already recently done. If the patient is found to be osteopenic or osteoporotic, routine vitamin D and calcium treatment should be tried first, but if follow-up bone densitometry proves such basic treatment to be unhelpful, then bisphosphonate or anti-RANKL therapy is warranted. (I'll note I've often seen MCAS patients prove intolerant of bisphosphonates, but I've never seen bisphosphonate-intolerant MCAS patients prove intolerant of (or unresponsive to) denosumab.) Of course, the single best approach to managing bone disease consequent to MCAS is to control the MCAS as best as possible. I should also note that use of bisphosphonates or denosumab requires pre-treatment clearance by a dentist due to the possibility of these drugs causing (potentially quite morbid, even fatal) osteonecrosis of the jaw (ONJ) in patients with poor dentition or recent dental work.     A priori, the odds of eventually achieving the typical (palliative) MCAS management goal (of finding a regimen that will help the patient feel significantly better than the pre-treatment baseline the majority of the time) in any given MCAS patient are as good as in any other patient (that is to say, pretty good), but only time will tell the ultimate outcome for the individual patient.     In case the following might be helpful, here are a few references to peer-reviewed literature and other educational material about MCAS (authorial bias acknowledged):     1. A short review that provides the \"Coreys 2011\" diagnostic criteria: http://www.[email or web address]/content/[email or web address]. Also, the Valent 2012 criteria are available at [email or web address], but I'll note I strongly favor the [place] 2011 criteria over the Valent 2012 criteria since in my experience the latter fit the observed and reported biology of the disease less well than the former.     2. An updated " short review from 2014: [email or web address].     3. An approach to diagnosis from 2014: [email or web address].     4. A comprehensive review chapter from 2013: [email or web address]/catalog/product_info.php?products_id=14107.     5. A transcribed grand rounds (including slides) from 2011 ([facility]): [email or web address].     6. A video of a grand rounds from 2014 ([facility]): see the August 6, 2014 entry under Allergy Webinars at [email or web address].     7. There are many reviews in the peer-reviewed medical literature about perioperative management of mast cell disease (all focused on mastocytosis, but the principles are the same for MCAS). At present, the most recent is Dewachter et al., Anesthesiology 2014, DOI 10.1097/ALN.[id number] (this may be freely available to some at [email or web address].pubs.[email or web address]/article.[email or web address]?[email or web address]). A shorter discussion of perioperative management is available from The Mastocytosis Society at http://[email or web address]/documents/ChroniclesSE.pdf.     8. I should note, too, that given the fundamental precept that mast cells are present, and contribute to regulation of function, in *all* systems/organs/tissues, it is most certainly the case that MCAS can cause a wide range of neuropsychiatric symptoms and disorders, too. Prior to diagnosis, most MCAS patients are thought by at least some of their providers, family, and acquaintances to be psychosomatic. Along with a number of co-authors, I recently published a review of the neuropsychiatric aspects of MCAD. It's not freely available, but the access point is http://www.sciencedirect.com/science/article/pii/[email or web address]. I'm happy to provide a copy of this for private use upon request.     9. My early experience with  "continuous diphenhydramine infusion is available as an American Society of Hematology 2015 abstract at http://[email or web address]/[email or web address].     10. A comprehensive review of pharmacologic therapy of systemic mast cell activation disease has been published recently (Blanca POWELL et al., Pharmacological treatment options for mast cell activation disease, [place] Arch Pharmacol 2016 Apr 30, DOI: [id number]).     In summary, I think there are many therapeutic directions that could be legitimately pursued in this patient, and there simply are no data at present to say, or even suggest, that any one particular sequencing of medication trials is \"more right\" or \"more wrong\" than any other sequencing. Determination of a particular sequence to be pursued in this patient will depend on the treating provider's best clinical sense of the matter as influenced by present and emerging symptoms and findings, patient desires, and treatment accessibility (i.e., vis-a-vis third-party payer coverage). Again, there is no \"right\" or \"wrong\" decision here regarding which medication to try next. I can only emphasize the importance of having the patience to make just one change in the regimen at a time whenever possible, and in truth the only \"wrong\" decision that can be made here is to stop trying altogether (unless, of course, the patient decides, for whatever reason, she just does not want to pursue any more medication trials).     All of the above having been said, I think that given the particulars of her presentation, it might be best to focus aggressively at first on identifying an optimal full (H1 + H2) histamine receptor blocker regimen for her.  While an optimal antihistamine regimen is being established, I think that a further local review of her case (perhaps by an internist or rheumatologist?) with respect to the potential presence of any other inflammatory diseases contributing to her " "normocytic anemia would be reasonable; I'd give particular thought, given her dietary constraints, to the potential for micronutrient deficiencies, and a good bit of testing (perhaps on guidance from consultation with a nutritional gastroenterologist?) in this regard might be reasonable (e.g., vitamins C and D, B1, B2, B3, B6, E); I'm not sure I see signs here of any metal deficiencies; also, again, a quantitative immunoglobulin profile (at least IgG, IgA, IgM, and IgE) should be run if not already done at some point, and a serum protein electrophoresis might even be reasonable to see if there might be a monoclonal protein perhaps giving a hint of amyloidosis).  Meanwhile, back on the therapeutic front, after an optimal antihistamine regimen is established, then given her inflammatory issues, trials of NSAIDs (e.g., aspirin first, then celecoxib if intolerant of or unresponsive to aspirin) would be reasonable.  Her anxiety issues (which, like all of the rest of her symptoms, are well within the realm of [facility]) suggest a trial of (low-dose, but regularly dosed) benzodiazepines (as extensively detailed above) would be reasonable.  For GI tract issues, medications beyond antihistamines that might be helpful include (but certainly are not limited to) oral cromolyn, montelukast, compounded oral ketotifen, and low-dose benzodiazepines.  If oral cromolyn is helpful, other forms of cromolyn (e.g., nasal, ophthalmic, nebulized) might also help.  On the more expensive end of the therapeutic range, it would not be unreasonable to try omalizumab \"sooner than later\" given her wide range of reactivities.  Clearly, there are many options, and it will be important for her to try just one at a time as much as possible, and it also will be best for her to work principally with just one of her physicians in stepping through trials of various medications for this disease, though other local consultants certainly can be " "engaged as necessary for trials of select medications with which her lead physician may be unfamiliar or otherwise uncomfortable in personally prescribing (e.g., imatinib).     The previously established follow-up plan remains sufficient. The patient understands that out of professional courtesy I will not \"cold-call\" or \"cold-email\" any health care provider about her. However, I will be happy to respond to any provider's *direct* request to me for additional input in her case. I can best be contacted either via e-mail at [email or web address], or a phone call can be scheduled via office staff here at [phone], or I can be paged via our [facility]'s paging  at [phone] if emergency consultation is felt necessary. Also, my clinic nurse coordinator, Clary Regan RN, can be contacted at [phone]; patients wishing to contact her electronically should preferentially use the \"[employer]\" portion of the electronic medical record system here. Of note, at present I do not provide phone-based (or video-based) follow-up visits to patients, and the phone numbers I've provided here are intended for use by the patient's physicians and other providers. A patient who calls me to report new problems (or severe exacerbations of old problems) and request guidance (or request provision of guidance to her local provider) should expect me to redirect her to her local providers to first be properly assessed in standard fashion for the particular problems and complaints being experienced at that time, and if, after such evaluation has been performed, the provider desires to discuss the situation further with me, I will be happy to engage in that dialogue if, again, the provider directly requests my input. As stated above, mast cell disease does not render one immune to other diseases, and the patient and all of her providers must remain vigilant to the possible emergence of other illnesses (whether ultimately attributable " to her mast cell disease or not) for which standard (non-mast-cell-directed) treatments have been defined.     I spent another two hours writing this addendum, but as this was not face-to-face time with the patient, no additional billing was submitted.      Typed, reviewed, and electronically signed by: Britton Rodarte M.D.     DT: 03/25/17 06:47 PM    cc: 1. Terrell Boss M.D. ([address] [place] [address], [phone], fax [phone])  2. Fe Cole M.D. ([facility], [address], [place] [address], [phone], fax [phone])  3. Please also mail a copy to the patient at her home address as listed in the system.

## 2017-03-01 ENCOUNTER — CARE COORDINATION (OUTPATIENT)
Dept: ONCOLOGY | Facility: CLINIC | Age: 42
End: 2017-03-01

## 2017-03-01 ENCOUNTER — TELEPHONE (OUTPATIENT)
Dept: ONCOLOGY | Facility: CLINIC | Age: 42
End: 2017-03-01

## 2017-03-01 ENCOUNTER — TELEPHONE (OUTPATIENT)
Dept: NURSING | Facility: CLINIC | Age: 42
End: 2017-03-01

## 2017-03-01 ENCOUNTER — MYC MEDICAL ADVICE (OUTPATIENT)
Dept: ONCOLOGY | Facility: CLINIC | Age: 42
End: 2017-03-01

## 2017-03-01 DIAGNOSIS — R53.82 CHRONIC FATIGUE: ICD-10-CM

## 2017-03-01 LAB
RESULT: NORMAL
SEND OUTS MISC TEST CODE: NORMAL
SEND OUTS MISC TEST SPECIMEN: NORMAL
TEST NAME: NORMAL

## 2017-03-01 NOTE — TELEPHONE ENCOUNTER
To: [facility] (Ph: [phone] and Fax: [phone])    Fr: Dr Rodarte, [facility], [facility] (Ph: [phone] and Fax: [phone])    Our mutual patient, Virginia Sheffield (: 1975) had endoscopic procedures with biopsies collected at your facility in 2015.    We are requesting slides of re-cut, unstained, polyp and non-polyp biopsy tissues; we plan to stain these specimens with  to look for mast cells. Our pathologist would prefer 3 slides of unstained sections (on  sticky  or charged slides) per tissue block. These slides, as well as old slides, should be sent overnight ([employer] acct # [id number]) to:    Dr. Britton Rodarte MD  [facility], [facility] [address]    [place] [address]  Attn: Clary Regan RN    If you have any questions, please feel free to call our nursing line at [phone].    Thank you for your assistance.    Britton Rodarte M.D.  Associate Professor of Medicine  Division of Hematology, Oncology & Transplantation  [facility]  E-mail:  [email or web address]  Office phone:  [phone]  Digital pager:  [phone]  =============================================================================================================

## 2017-03-01 NOTE — PROGRESS NOTES
"RN tried to received pt/ this morning.  First tried Edwin's cell phone and although seemed to be \"answered\" could not hear anything.  Tried Virginia's phone and got voicemail but it cut off with busy signal part way thru message asking her to have additional blood drawn (immunoglobulins) when she returns 24 hour urine collection today.  Also called second time requesting pt to call back to discuss message tried to leave for her.    "

## 2017-03-02 LAB
CGA SERPL-MCNC: 109 NG/ML
TRYPTASE SERPL-MCNC: 4.5 NG/ML

## 2017-03-02 NOTE — TELEPHONE ENCOUNTER
"Call Type: Triage Call    Presenting Problem: \"The clinic called, but we missed it; my wife  needs more blood work done, but we are on our way to [place].  Is  therre a way to expidite this so we get results sooner?\" EPIC note  left.  Triage Note:  Guideline Title: Information Only Call; No Symptom Triage (Adult)  Recommended Disposition: Call Provider When Office is Open  Original Inclination: Wanted to speak with a nurse  Override Disposition:  Intended Action: Follow advice given  Physician Contacted: No  Requesting information and provider is best resource; no triage required. ?  YES  Requesting regular office appointment ? NO  Sign(s) or symptom(s) associated with a diagnosed condition or with a new illness  ? NO  Requesting information about provider, services or community resources ? NO  Call back to complete assessment/clarification of information from prior caller to  complete triage ? NO  Physician Instructions:  Care Advice:  "

## 2017-03-02 NOTE — TELEPHONE ENCOUNTER
Clinic Action Needed:Yes  Reason for Call: Patient wants to arrange new blood work while they are in [place] to get results asap, pls call them back at [phone].  Routed to: DK Masterson RN  [facility]

## 2017-03-03 LAB
COLLECT DURATION TIME UR: NORMAL H
CREAT UR-MCNC: 29 MG/DL
ME-HISTAMINE/CREAT 24H UR: 154
SPECIMEN VOL 24H UR: NORMAL L

## 2017-03-05 LAB — HISTAMINE SERPL-SCNC: NORMAL NMOL/L

## 2017-03-07 ENCOUNTER — DOCUMENTATION ONLY (OUTPATIENT)
Dept: ONCOLOGY | Facility: CLINIC | Age: 42
End: 2017-03-07

## 2017-03-07 PROCEDURE — 88342 IMHCHEM/IMCYTCHM 1ST ANTB: CPT | Performed by: INTERNAL MEDICINE

## 2017-03-07 PROCEDURE — 88323 CONSLTJ&REPRT MATRL PREP SLD: CPT | Performed by: INTERNAL MEDICINE

## 2017-03-07 PROCEDURE — 00000346 ZZHCL STATISTIC REVIEW OUTSIDE SLIDES TC 88321: Performed by: INTERNAL MEDICINE

## 2017-03-07 NOTE — PROGRESS NOTES
Pathology Slides received from Division of Anatomic Pathology, [facility]. 3 Slides: [id number].  Division of Anatomic Pathology, [facility]  requests slides not be returned to them. Slides delivered to pathology lab at [facility], fifth floor.

## 2017-03-08 LAB
COLLECT DURATION TIME UR: 24 H
CREAT UR-MCNC: 40 MG/DL
LEUKOTRIENE E4 URINE: NORMAL
ME-HISTAMINE/CREAT 24H UR: 151
SPECIMEN VOL 24H UR: 2200 L

## 2017-03-09 LAB
ANTI IGE ANTIBODY: NORMAL
LEUKOTRIENE E4 URINE: NORMAL

## 2017-03-10 LAB
2,3 11B PROSTAGLANDIN F2A UR: 2840
2,3 11B PROSTAGLANDIN F2A UR: NORMAL
CIU ASSOCIATED BASOPHIL ACTIVATION: 3
IGE RECEP AB COMMENT: NORMAL

## 2017-03-11 LAB
COLLECT DURATION TIME UR: 24 H
COLLECT DURATION TIME UR: NORMAL H
PROSTAGLANDIN D2 24H UR-MRATE: 110
PROSTAGLANDIN D2 24H UR-MRATE: NORMAL
PROSTAGLANDIN D2: 27
SPECIMEN VOL 24H UR: 2200 L
SPECIMEN VOL 24H UR: NORMAL L

## 2017-03-21 LAB — COPATH REPORT: NORMAL

## 2017-12-24 ENCOUNTER — HEALTH MAINTENANCE LETTER (OUTPATIENT)
Age: 42
End: 2017-12-24

## 2020-03-10 ENCOUNTER — HEALTH MAINTENANCE LETTER (OUTPATIENT)
Age: 45
End: 2020-03-10

## 2020-12-27 ENCOUNTER — HEALTH MAINTENANCE LETTER (OUTPATIENT)
Age: 45
End: 2020-12-27

## 2021-02-28 ENCOUNTER — HEALTH MAINTENANCE LETTER (OUTPATIENT)
Age: 46
End: 2021-02-28

## 2021-04-24 ENCOUNTER — HEALTH MAINTENANCE LETTER (OUTPATIENT)
Age: 46
End: 2021-04-24

## 2021-10-03 ENCOUNTER — HEALTH MAINTENANCE LETTER (OUTPATIENT)
Age: 46
End: 2021-10-03

## 2022-03-20 ENCOUNTER — HEALTH MAINTENANCE LETTER (OUTPATIENT)
Age: 47
End: 2022-03-20

## 2022-05-15 ENCOUNTER — HEALTH MAINTENANCE LETTER (OUTPATIENT)
Age: 47
End: 2022-05-15

## 2022-09-11 ENCOUNTER — HEALTH MAINTENANCE LETTER (OUTPATIENT)
Age: 47
End: 2022-09-11

## 2023-04-30 ENCOUNTER — HEALTH MAINTENANCE LETTER (OUTPATIENT)
Age: 48
End: 2023-04-30

## 2023-06-03 ENCOUNTER — HEALTH MAINTENANCE LETTER (OUTPATIENT)
Age: 48
End: 2023-06-03